# Patient Record
Sex: MALE | Race: ASIAN | NOT HISPANIC OR LATINO | Employment: FULL TIME | URBAN - METROPOLITAN AREA
[De-identification: names, ages, dates, MRNs, and addresses within clinical notes are randomized per-mention and may not be internally consistent; named-entity substitution may affect disease eponyms.]

---

## 2018-06-30 ENCOUNTER — HOSPITAL ENCOUNTER (EMERGENCY)
Facility: HOSPITAL | Age: 43
Discharge: HOME | End: 2018-06-30
Attending: EMERGENCY MEDICINE
Payer: COMMERCIAL

## 2018-06-30 VITALS
OXYGEN SATURATION: 95 % | DIASTOLIC BLOOD PRESSURE: 94 MMHG | RESPIRATION RATE: 18 BRPM | HEART RATE: 88 BPM | HEIGHT: 64 IN | TEMPERATURE: 99 F | WEIGHT: 170 LBS | SYSTOLIC BLOOD PRESSURE: 156 MMHG | BODY MASS INDEX: 29.02 KG/M2

## 2018-06-30 DIAGNOSIS — R52 PAIN: ICD-10-CM

## 2018-06-30 DIAGNOSIS — S22.32XA CLOSED FRACTURE OF ONE RIB OF LEFT SIDE, INITIAL ENCOUNTER: Primary | ICD-10-CM

## 2018-06-30 PROCEDURE — 96372 THER/PROPH/DIAG INJ SC/IM: CPT

## 2018-06-30 PROCEDURE — 25000003 PHARM REV CODE 250: Performed by: EMERGENCY MEDICINE

## 2018-06-30 PROCEDURE — 99284 EMERGENCY DEPT VISIT MOD MDM: CPT | Mod: 25

## 2018-06-30 PROCEDURE — 63600175 PHARM REV CODE 636 W HCPCS: Performed by: EMERGENCY MEDICINE

## 2018-06-30 RX ORDER — CYCLOBENZAPRINE HCL 10 MG
10 TABLET ORAL
Status: COMPLETED | OUTPATIENT
Start: 2018-06-30 | End: 2018-06-30

## 2018-06-30 RX ORDER — KETOROLAC TROMETHAMINE 30 MG/ML
60 INJECTION, SOLUTION INTRAMUSCULAR; INTRAVENOUS
Status: COMPLETED | OUTPATIENT
Start: 2018-06-30 | End: 2018-06-30

## 2018-06-30 RX ORDER — METHOCARBAMOL 500 MG/1
1000 TABLET, FILM COATED ORAL 3 TIMES DAILY
Qty: 30 TABLET | Refills: 0 | Status: SHIPPED | OUTPATIENT
Start: 2018-06-30 | End: 2018-07-05

## 2018-06-30 RX ORDER — OXYCODONE HYDROCHLORIDE 5 MG/1
5 TABLET ORAL EVERY 4 HOURS PRN
Qty: 12 TABLET | Refills: 0 | OUTPATIENT
Start: 2018-06-30 | End: 2018-09-13

## 2018-06-30 RX ORDER — NAPROXEN 500 MG/1
500 TABLET ORAL 2 TIMES DAILY
Qty: 30 TABLET | Refills: 0 | OUTPATIENT
Start: 2018-06-30 | End: 2018-09-13

## 2018-06-30 RX ADMIN — KETOROLAC TROMETHAMINE 60 MG: 30 INJECTION, SOLUTION INTRAMUSCULAR; INTRAVENOUS at 09:06

## 2018-06-30 RX ADMIN — CYCLOBENZAPRINE HYDROCHLORIDE 10 MG: 10 TABLET, FILM COATED ORAL at 09:06

## 2018-07-01 NOTE — ED PROVIDER NOTES
Encounter Date: 6/30/2018       History     Chief Complaint   Patient presents with    Pain to rib cage     Pt reports that he has left side rib cage pain after being hit while playing kick ball.  Pt reports pain with deep breaths cough, sneeze or extended arm.  Pt also report if he lay on either side pain gets worse. Aissatou taken this am with min relief.     44 yo male with left chest wall pain for 6 days after collision during kick ball game.      The history is provided by the patient.     Review of patient's allergies indicates:   Allergen Reactions    Tylenol [acetaminophen] Hives     History reviewed. No pertinent past medical history.  History reviewed. No pertinent surgical history.  No family history on file.  Social History   Substance Use Topics    Smoking status: Never Smoker    Smokeless tobacco: Not on file    Alcohol use No     Review of Systems    Physical Exam     Initial Vitals [06/30/18 2017]   BP Pulse Resp Temp SpO2   (!) 161/95 88 18 98.8 °F (37.1 °C) (!) 94 %      MAP       --         Physical Exam    Nursing note and vitals reviewed.  Constitutional: He appears well-developed and well-nourished.   HENT:   Head: Normocephalic and atraumatic.   Nose: Nose normal.   Eyes: Conjunctivae and EOM are normal. Pupils are equal, round, and reactive to light.   Neck: Normal range of motion. Neck supple.   Cardiovascular: Normal rate, regular rhythm, normal heart sounds and intact distal pulses.   Pulmonary/Chest: Breath sounds normal.   Pt with tenderness to left chest wall - point tenderness, worse with deep breathing / cough.    Musculoskeletal: Normal range of motion.   Neurological: He is alert and oriented to person, place, and time. He has normal strength and normal reflexes.   Skin: Skin is warm and dry.   Psychiatric: He has a normal mood and affect.         ED Course   Procedures  Labs Reviewed - No data to display       Imaging Results          X-Ray Ribs 2 View Left (Final result)   Result time 06/30/18 21:22:06    Final result by Red Vega MD (06/30/18 21:22:06)                 Impression:      1. Acute fracture of left lateral rib 5.      Electronically signed by: Red Vega MD  Date:    06/30/2018  Time:    21:22             Narrative:    EXAMINATION:  XR RIBS 2 VIEW LEFT    CLINICAL HISTORY:  Pain, unspecified    TECHNIQUE:  Two views of the left ribs were performed.    COMPARISON:  None.    FINDINGS:  Four views left ribs.    There is an acute appearing fracture of left lateral rib 5.  The visualized left lung zones are grossly clear..                                 Medical Decision Making:   Initial Assessment:   Left chest wall pain after trauma during softball game.   Differential Diagnosis:   Rib fx / rib strain / sprain, chest wall contusion  Clinical Tests:   Radiological Study: Ordered and Reviewed  ED Management:  42 yo male with left chest wall pain with rib fx. dispo with meds and instructions.                      Clinical Impression:   The primary encounter diagnosis was Closed fracture of one rib of left side, initial encounter. A diagnosis of Pain was also pertinent to this visit.                             Sj Redding MD  06/30/18 2802

## 2018-07-01 NOTE — ED TRIAGE NOTES
Patient arrived to ED with c/o left side rib pain secondary to being hit while playing kickball at work x 5 days ago.  Reports that pain is worse with deep breathing, lifting left arm, laying on left side, sneezing, or coughing.  No bruising, swelling, or deformity noted.  Patient is able to take deep breaths with equal bilateral breath sounds.

## 2018-09-12 ENCOUNTER — HOSPITAL ENCOUNTER (EMERGENCY)
Facility: HOSPITAL | Age: 43
Discharge: HOME | End: 2018-09-13
Attending: EMERGENCY MEDICINE
Payer: COMMERCIAL

## 2018-09-12 DIAGNOSIS — N20.0 NEPHROLITHIASIS: Primary | ICD-10-CM

## 2018-09-12 LAB
BILIRUB UR QL STRIP: NEGATIVE
CLARITY UR: CLEAR
COLOR UR: NORMAL
GLUCOSE UR QL STRIP: NEGATIVE
HGB UR QL STRIP: NEGATIVE
KETONES UR QL STRIP: NEGATIVE
LEUKOCYTE ESTERASE UR QL STRIP: NEGATIVE
NITRITE UR QL STRIP: NEGATIVE
PH UR STRIP: 7 [PH] (ref 5–8)
PROT UR QL STRIP: NEGATIVE
SP GR UR STRIP: 1.01 (ref 1–1.03)
URN SPEC COLLECT METH UR: NORMAL
UROBILINOGEN UR STRIP-ACNC: NEGATIVE EU/DL

## 2018-09-12 PROCEDURE — 99284 EMERGENCY DEPT VISIT MOD MDM: CPT

## 2018-09-12 PROCEDURE — 81003 URINALYSIS AUTO W/O SCOPE: CPT

## 2018-09-13 VITALS
HEART RATE: 60 BPM | OXYGEN SATURATION: 98 % | TEMPERATURE: 98 F | HEIGHT: 65 IN | BODY MASS INDEX: 27.49 KG/M2 | RESPIRATION RATE: 18 BRPM | SYSTOLIC BLOOD PRESSURE: 140 MMHG | WEIGHT: 165 LBS | DIASTOLIC BLOOD PRESSURE: 90 MMHG

## 2018-09-13 PROBLEM — N20.0 NEPHROLITHIASIS: Status: ACTIVE | Noted: 2018-09-13

## 2018-09-13 PROCEDURE — 25000003 PHARM REV CODE 250: Performed by: NURSE PRACTITIONER

## 2018-09-13 RX ORDER — OXYCODONE HYDROCHLORIDE 5 MG/1
5 TABLET ORAL
Status: COMPLETED | OUTPATIENT
Start: 2018-09-13 | End: 2018-09-13

## 2018-09-13 RX ORDER — OXYCODONE HYDROCHLORIDE 5 MG/1
5 CAPSULE ORAL EVERY 4 HOURS PRN
Qty: 30 CAPSULE | Refills: 0 | Status: SHIPPED | OUTPATIENT
Start: 2018-09-13 | End: 2018-10-18 | Stop reason: ALTCHOICE

## 2018-09-13 RX ORDER — TAMSULOSIN HYDROCHLORIDE 0.4 MG/1
0.4 CAPSULE ORAL DAILY
Qty: 14 CAPSULE | Refills: 0 | Status: SHIPPED | OUTPATIENT
Start: 2018-09-13 | End: 2018-10-18 | Stop reason: ALTCHOICE

## 2018-09-13 RX ADMIN — OXYCODONE HYDROCHLORIDE 5 MG: 5 TABLET ORAL at 12:09

## 2018-09-13 NOTE — ED PROVIDER NOTES
Encounter Date: 9/12/2018     This is a 43 y.o. male complaining of left flank pain he believes may be a kidney stone. Pain is intermittent and severe. Hx of previous kidney stones and states that this is similar. No varsha hematuria. Symptoms began about 4 hours ago. Took naproxen PTA.    I have evaluated and conducted a medical screening exam with initial orders entered, if indicated, to expedite care. The patient will be placed in a treatment area when one is available. Care will be transferred to an alternate provider for a full assessment including but not limited to: history, physical exam, additional orders, and final disposition.    Bobby Tellez NP         History     Chief Complaint   Patient presents with    Abdominal Pain     reports left sided abdominal pain and left flank pain; reports hx kidney stones and reports this feels like the kidney stones before; reports he had bad pain, it resided, and not it's back; symptoms started around 1900 today     42 y/o male which presents with left sided back pain that comes to his abdomen. The pain began around 7pm and is intermittent. Pt denies dysuria, blood in urine, chest pain, blurry vision, or headache. Pt does have history of kidney stones and he states the pain feels the same way.       The history is provided by the patient.     Review of patient's allergies indicates:   Allergen Reactions    Tylenol [acetaminophen] Hives     History reviewed. No pertinent past medical history.  History reviewed. No pertinent surgical history.  No family history on file.  Social History     Tobacco Use    Smoking status: Never Smoker    Smokeless tobacco: Never Used   Substance Use Topics    Alcohol use: No    Drug use: No     Review of Systems   Constitutional: Negative for chills and fever.   Respiratory: Negative for chest tightness and shortness of breath.    Cardiovascular: Negative for chest pain.   Gastrointestinal: Positive for abdominal pain. Negative for  nausea and vomiting.   Genitourinary: Positive for flank pain. Negative for dysuria, hematuria, penile pain and testicular pain.   Musculoskeletal: Positive for back pain.   All other systems reviewed and are negative.    Physical Exam     Initial Vitals [09/12/18 2305]   BP Pulse Resp Temp SpO2   (!) 182/113 85 16 98.7 °F (37.1 °C) 98 %      MAP       --         Physical Exam    Nursing note and vitals reviewed.  Constitutional: He appears well-developed and well-nourished.   HENT:   Head: Normocephalic and atraumatic.   Cardiovascular: Normal rate, regular rhythm, normal heart sounds and intact distal pulses. Exam reveals no gallop and no friction rub.    No murmur heard.  Pulmonary/Chest: Breath sounds normal. No respiratory distress. He has no wheezes. He has no rhonchi. He has no rales. He exhibits no tenderness.   Abdominal: Soft. Normal appearance and bowel sounds are normal. He exhibits no distension and no mass. There is tenderness in the left lower quadrant. There is CVA tenderness (left sided). There is no rebound and no guarding.   Musculoskeletal: Normal range of motion. He exhibits no edema or tenderness.   Neurological: He is alert and oriented to person, place, and time. He has normal strength. GCS score is 15. GCS eye subscore is 4. GCS verbal subscore is 5. GCS motor subscore is 6.       ED Course   Procedures  Labs Reviewed   URINALYSIS, REFLEX TO URINE CULTURE    Narrative:     Preferred Collection Type->Urine, Clean Catch          Imaging Results          CT Abdomen Pelvis  Without Contrast (Final result)  Result time 09/13/18 00:22:59    Final result by Jennifer Blood MD (09/13/18 00:22:59)                 Impression:      1. Two adjacent 5 mm stones within the left mid ureter resulting in mild proximal hydronephrosis.  2. Punctate nonobstructing right renal stone.      Electronically signed by: eJnnifer Blood MD  Date:    09/13/2018  Time:    00:22             Narrative:    EXAMINATION:  CT  ABDOMEN PELVIS WITHOUT CONTRAST    CLINICAL HISTORY:  Flank pain, stone disease suspected;    TECHNIQUE:  Low dose axial images, sagittal and coronal reformations were obtained from the lung bases to the pubic symphysis.  Oral contrast was not administered.    COMPARISON:  None    FINDINGS:  The visualized portion of the heart is unremarkable.  The lung bases are clear.    No significant hepatic abnormality seen on this noncontrast exam.  There is no intra-or extrahepatic biliary ductal dilatation.  The gallbladder is unremarkable.  The stomach, pancreas, spleen, and adrenal glands are unremarkable.    Punctate nonobstructing stone is seen within the right kidney.  There are two adjacent 5 mm stone is visualized within the left mid ureter which result in mild proximal hydronephrosis on the left.  No additional renal stones are seen.  No stones are seen along the right ureteral course. Urinary bladder and prostate show no significant abnormalities.    Appendix is visualized and is unremarkable.  The visualized loops of small and large bowel show no evidence of obstruction or inflammation.  No free air or free fluid.    Aorta tapers normally.    No acute osseous abnormality identified.  Remote left anterolateral 6th rib fracture is seen.  Subcutaneous soft tissue structures are unremarkable.                                 Medical Decision Making:   Initial Assessment:   44 y/o male which presents with left sided flank and LLQ pain. Pt has a history of kidney stones and he feels like its a kidney stone. No hematuria per patient.   Differential Diagnosis:   Nephrolithiasis, back pain, constipation, UTI  Clinical Tests:   Lab Tests: Reviewed and Ordered  The following lab test(s) were unremarkable: Urinalysis  Radiological Study: Ordered and Reviewed  ED Management:  Pt examined and given oxycodone ER as he is allergic to tylenol. Ct abdomen/pelvis indicated 2 5 mm nephrolithiasis. Pt given strict return precautions and  advised to drink at least 2L of fluid a day. He was given flomax and pain medication along with a strainer to strain his urine. Ambulatory referral to urology placed. Pt voiced understanding of all discharge instructions.                       Clinical Impression:   The encounter diagnosis was Nephrolithiasis.                             Mayra Blackmon, ESSIE  09/13/18 0035

## 2018-09-13 NOTE — ED TRIAGE NOTES
Pt presents to ED with reports of left sided abdominal pain and left flank pain; reports hx kidney stones and reports this feels like the kidney stones before; reports he had bad pain, it resided, and not it's back; symptoms started around 1900 today

## 2018-09-14 ENCOUNTER — HOSPITAL ENCOUNTER (EMERGENCY)
Facility: HOSPITAL | Age: 43
Discharge: HOME | End: 2018-09-14
Attending: EMERGENCY MEDICINE
Payer: COMMERCIAL

## 2018-09-14 VITALS
SYSTOLIC BLOOD PRESSURE: 164 MMHG | RESPIRATION RATE: 16 BRPM | HEIGHT: 64 IN | WEIGHT: 165 LBS | DIASTOLIC BLOOD PRESSURE: 100 MMHG | HEART RATE: 91 BPM | BODY MASS INDEX: 28.17 KG/M2 | OXYGEN SATURATION: 98 % | TEMPERATURE: 99 F

## 2018-09-14 DIAGNOSIS — N20.0 NEPHROLITHIASIS: Primary | ICD-10-CM

## 2018-09-14 PROCEDURE — 96372 THER/PROPH/DIAG INJ SC/IM: CPT

## 2018-09-14 PROCEDURE — 99283 EMERGENCY DEPT VISIT LOW MDM: CPT | Mod: 25

## 2018-09-14 PROCEDURE — 63600175 PHARM REV CODE 636 W HCPCS: Performed by: PHYSICIAN ASSISTANT

## 2018-09-14 RX ORDER — KETOROLAC TROMETHAMINE 30 MG/ML
15 INJECTION, SOLUTION INTRAMUSCULAR; INTRAVENOUS
Status: COMPLETED | OUTPATIENT
Start: 2018-09-14 | End: 2018-09-14

## 2018-09-14 RX ADMIN — KETOROLAC TROMETHAMINE 15 MG: 30 INJECTION, SOLUTION INTRAMUSCULAR; INTRAVENOUS at 06:09

## 2018-09-14 NOTE — DISCHARGE INSTRUCTIONS
Take Flomax, Percocet and Zofran as previously prescribed.     Follow up with Urology. Return to ER for worsening symptoms, difficulty urinating, fever or as needed.

## 2018-09-14 NOTE — PROVIDER PROGRESS NOTES - EMERGENCY DEPT.
Encounter Date: 9/12/2018    ED Physician Progress Notes         patient return to ED today stating that his oxycodone was causing significant nausea vomiting. Discussed the patient about other options, patient stated that he was able to take NyQuil:  Cold which including Tylenol in the past without any allergic reaction.  Patient stated he took Tylenol and ibuprofen few years ago and had hives but otherwise no other allergic reaction report since.  Prescription for Percocet 7.5 mg and Zofran 4 mg ODT given at ED today.  Patient was instructed to start taking medication if he have any allergic reactions.

## 2018-09-14 NOTE — ED PROVIDER NOTES
Encounter Date: 9/14/2018       History     Chief Complaint   Patient presents with    Nephrolithiasis     Was dx with stones and is still having a lot of pain on left side     CC: L flank pain    HPI:  Patient is a 43-year-old male with  History of nephrolithiasis, previously seen at this facility on 09/12/2018 for left flank pain, hematuria, NV and diagnosed with nephrolithiasis  Presents for evaluation of persisting pain to left flank.  Patient receives taking Flomax as prescribed. He was also taking oxycodone, however, reports associated nausea and vomiting with the medication. Patient has prescription for Percocet and Zofran as prescribed earlier today at this facility. Denies difficulty urinating, fever, diarrhea, constipation, hematuria.                 Review of patient's allergies indicates:   Allergen Reactions    Tylenol [acetaminophen] Hives     History reviewed. No pertinent past medical history.  History reviewed. No pertinent surgical history.  History reviewed. No pertinent family history.  Social History     Tobacco Use    Smoking status: Never Smoker    Smokeless tobacco: Never Used   Substance Use Topics    Alcohol use: No    Drug use: No     Review of Systems   Constitutional: Negative for chills and fever.   Gastrointestinal: Positive for nausea and vomiting. Negative for abdominal pain.   Genitourinary: Positive for flank pain. Negative for difficulty urinating, dysuria, frequency, hematuria and urgency.   Neurological: Negative for dizziness and light-headedness.       Physical Exam     Initial Vitals [09/14/18 1836]   BP Pulse Resp Temp SpO2   (!) 164/100 91 16 99.3 °F (37.4 °C) 98 %      MAP       --         Physical Exam    Nursing note and vitals reviewed.  Constitutional: He appears well-developed and well-nourished. No distress.   HENT:   Head: Normocephalic.   Right Ear: Hearing, tympanic membrane, external ear and ear canal normal.   Left Ear: Hearing, tympanic membrane, external  ear and ear canal normal.   Nose: Nose normal.   Mouth/Throat: Uvula is midline, oropharynx is clear and moist and mucous membranes are normal.   Eyes: Conjunctivae are normal.   Neck: Neck supple.   Cardiovascular: Normal rate and regular rhythm. Exam reveals no gallop and no friction rub.    No murmur heard.  Pulmonary/Chest: Breath sounds normal. No respiratory distress. He has no wheezes. He has no rhonchi. He has no rales.   Abdominal: Soft. Bowel sounds are normal. He exhibits no distension. There is no tenderness. There is no rebound and no guarding.   TTP to L flank   Musculoskeletal: Normal range of motion.   Lymphadenopathy:     He has no cervical adenopathy.   Neurological: He is alert.   Skin: Skin is warm and dry. No rash noted.   Psychiatric: He has a normal mood and affect.         ED Course   Procedures  Labs Reviewed - No data to display       Imaging Results    None          Medical Decision Making:   Initial Assessment:     43-year-old male with history of nephrolithiasis presenting for evaluation of persistent left flank pain.  Patient diagnosed with nephrolithiasis at this facility 2 days ago.  Reports compliance with Flomax and oxycodone but reports nausea and vomiting that is associated with taking oxycodone.  Patient was evaluated at this facility earlier today prescribed Percocet and Zofran for symptomatic treatment.   Patient is afebrile, well-appearing in no distress.  Exam fi ndings detailed above.   Toradol given for pain.  Patient denies difficulty urinating.  He denies fever.  Afebrile at this facility.  Will have patient follow up with urologist at his appointment in 6 days.  And return to the ER for worsening symptoms, difficulty urinating, fever or as needed.  Discussed this patient with Dr. Barrios who agrees with assessment and plan.               Attending Attestation:     Physician Attestation Statement for NP/PA:   I have conducted a face to face encounter with this patient in  addition to the NP/PA, due to Patient Request    Other NP/PA Attestation Additions:    History of Present Illness: 43-year-old male with recent history of a left flank pain due to ureterolithiasis.  Patient presented with pain after unable to take his pain medication due to nausea vomiting.     Physical Exam: Well-appearing, no acute nausea vomiting, tender to palpation to left flank.   Medical Decision Making: Pain control with IM Toradol.  Patient is stable for discharge                    Clinical Impression:   The encounter diagnosis was Nephrolithiasis.                             Lisa Arreguin PA-C  09/14/18 2038       Mandeep Barrios MD  09/14/18 [id number]

## 2018-09-14 NOTE — ED TRIAGE NOTES
Patient stated that he's having kidney pain. Was here Wed night and was diagnosed with stones. Patient is straining urine at home. Patient has an appt for 9-20-18 for Urology. Pain 8/10. Sharp in nature. Patient took Oxycodone with some relief however he vomits when he takes them.

## 2018-09-20 ENCOUNTER — OFFICE VISIT (OUTPATIENT)
Dept: UROLOGY | Facility: CLINIC | Age: 43
End: 2018-09-20
Payer: COMMERCIAL

## 2018-09-20 VITALS
HEIGHT: 64 IN | RESPIRATION RATE: 14 BRPM | SYSTOLIC BLOOD PRESSURE: 130 MMHG | DIASTOLIC BLOOD PRESSURE: 82 MMHG | WEIGHT: 164.44 LBS | BODY MASS INDEX: 28.07 KG/M2

## 2018-09-20 DIAGNOSIS — N20.0 NEPHROLITHIASIS: Primary | ICD-10-CM

## 2018-09-20 DIAGNOSIS — R10.9 LEFT FLANK PAIN: ICD-10-CM

## 2018-09-20 PROCEDURE — 99244 OFF/OP CNSLTJ NEW/EST MOD 40: CPT | Mod: S$GLB,,, | Performed by: UROLOGY

## 2018-09-20 PROCEDURE — 99999 PR PBB SHADOW E&M-EST. PATIENT-LVL IV: CPT | Mod: PBBFAC,,, | Performed by: UROLOGY

## 2018-09-20 RX ORDER — OXYCODONE AND ACETAMINOPHEN 7.5; 325 MG/1; MG/1
TABLET ORAL
Refills: 0 | COMMUNITY
Start: 2018-09-14 | End: 2018-10-18 | Stop reason: ALTCHOICE

## 2018-09-20 NOTE — PROGRESS NOTES
"Subjective:       Tito Barrios is a 43 y.o. male who is a new patient who was referred by Dr Barrios for evaluation of nephrolithiasis.      He was seen in ER 9/13/18 and 9/14/18 with L flank pain. CT showed two 5mm stones in L mid ureter with mild proximal hydronephrosis. Punctate R renal stone.     No prior h/o stones. Pain has been well controlled since last ER visit. He has been straining his urine without any stones noted.      The following portions of the patient's history were reviewed and updated as appropriate: allergies, current medications, past family history, past medical history, past social history, past surgical history and problem list.    Review of Systems  Constitutional: no fever or chills  ENT: no nasal congestion or sore throat  Respiratory: no cough or shortness of breath  Cardiovascular: no chest pain or palpitations  Gastrointestinal: no nausea or vomiting, tolerating diet  Genitourinary: as per HPI  Hematologic/Lymphatic: no easy bruising or lymphadenopathy  Musculoskeletal: no arthralgias or myalgias  Skin: no rashes or lesions  Neurological: no seizures or tremors  Behavioral/Psych: no auditory or visual hallucinations       Objective:    Vitals: /82   Resp 14   Ht 5' 4" (1.626 m)   Wt 74.6 kg (164 lb 7.4 oz)   BMI 28.23 kg/m²     Physical Exam   General: well developed, well nourished in no acute distress  Head: normocephalic, atraumatic  Neck: supple, trachea midline, no obvious enlargement of thyroid  HEENT: EOMI, mucus membranes moist, sclera anicteric, no hearing impairment  Lungs: symmetric expansion, non-labored breathing  Cardiovascular: regular rate and rhythm, normal pulses  Abdomen: soft, non tender, non distended, no palpable masses, no hepatosplenomegaly, no hernias, bladder nonpalpable. No CVA tenderness.  Musculoskeletal: no peripheral edema, normal ROM in bilateral upper and lower extremities  Lymphatics: no cervical or inguinal lymphadenopathy  Skin: no " rashes or lesions  Neuro: alert and oriented x 3, no gross deficits  Psych: normal judgment and insight, normal mood/affect and non-anxious  Genitourinary:   patient declined exam   AMANDA: patient declined exam      Lab Review   Urine analysis today in clinic shows Negative     No results found for: WBC, HGB, HCT, MCV, PLT  No results found for: CREATININE, BUN  No results found for: PSA  No results found for: PSADIAG    Imaging  CT reviewed  Reports and images were personally reviewed by me and discussed with the patient today         Assessment/Plan:      1. Nephrolithiasis    -Two 5mm stones in mid L ureter   - Doubt spontaneous passage   - Flomax   - Offered URS/LL for stone treatment. Will proceed tomorrow   - OR 9/21/18 for L URS/LL/stent. Discussed possible 2nd procedure.     2. Left flank pain    - 2/2 L ureteral stones       Follow up in 3 weeks

## 2018-09-21 ENCOUNTER — HOSPITAL ENCOUNTER (OUTPATIENT)
Dept: PREADMISSION TESTING | Facility: HOSPITAL | Age: 43
Discharge: HOME | End: 2018-09-21
Attending: UROLOGY | Admitting: UROLOGY
Payer: COMMERCIAL

## 2018-09-21 ENCOUNTER — ANESTHESIA (OUTPATIENT)
Dept: SURGERY | Facility: HOSPITAL | Age: 43
End: 2018-09-21
Payer: COMMERCIAL

## 2018-09-21 ENCOUNTER — HOSPITAL ENCOUNTER (OUTPATIENT)
Facility: HOSPITAL | Age: 43
Discharge: HOME | End: 2018-09-21
Attending: UROLOGY | Admitting: UROLOGY
Payer: COMMERCIAL

## 2018-09-21 ENCOUNTER — ANESTHESIA EVENT (OUTPATIENT)
Dept: SURGERY | Facility: HOSPITAL | Age: 43
End: 2018-09-21
Payer: COMMERCIAL

## 2018-09-21 VITALS
TEMPERATURE: 98 F | WEIGHT: 173.06 LBS | BODY MASS INDEX: 29.55 KG/M2 | SYSTOLIC BLOOD PRESSURE: 136 MMHG | HEART RATE: 64 BPM | RESPIRATION RATE: 16 BRPM | HEIGHT: 64 IN | DIASTOLIC BLOOD PRESSURE: 99 MMHG | OXYGEN SATURATION: 98 %

## 2018-09-21 VITALS
BODY MASS INDEX: 29.71 KG/M2 | OXYGEN SATURATION: 98 % | WEIGHT: 173.06 LBS | SYSTOLIC BLOOD PRESSURE: 149 MMHG | DIASTOLIC BLOOD PRESSURE: 91 MMHG | TEMPERATURE: 98 F | RESPIRATION RATE: 17 BRPM | HEART RATE: 66 BPM

## 2018-09-21 DIAGNOSIS — Z01.818 PRE-OP TESTING: Primary | ICD-10-CM

## 2018-09-21 DIAGNOSIS — N20.0 NEPHROLITHIASIS: Primary | ICD-10-CM

## 2018-09-21 LAB
BASOPHILS # BLD AUTO: 0.02 K/UL
BASOPHILS NFR BLD: 0.3 %
DIFFERENTIAL METHOD: NORMAL
EOSINOPHIL # BLD AUTO: 0.3 K/UL
EOSINOPHIL NFR BLD: 4.3 %
ERYTHROCYTE [DISTWIDTH] IN BLOOD BY AUTOMATED COUNT: 12.3 %
HCT VFR BLD AUTO: 44.7 %
HGB BLD-MCNC: 15.1 G/DL
LYMPHOCYTES # BLD AUTO: 3.3 K/UL
LYMPHOCYTES NFR BLD: 41.8 %
MCH RBC QN AUTO: 29.5 PG
MCHC RBC AUTO-ENTMCNC: 33.8 G/DL
MCV RBC AUTO: 87 FL
MONOCYTES # BLD AUTO: 0.6 K/UL
MONOCYTES NFR BLD: 7.9 %
NEUTROPHILS # BLD AUTO: 3.6 K/UL
NEUTROPHILS NFR BLD: 45.4 %
PLATELET # BLD AUTO: 256 K/UL
PMV BLD AUTO: 10 FL
RBC # BLD AUTO: 5.12 M/UL
WBC # BLD AUTO: 7.88 K/UL

## 2018-09-21 PROCEDURE — 36000707: Performed by: UROLOGY

## 2018-09-21 PROCEDURE — C1773 RET DEV, INSERTABLE: HCPCS | Performed by: UROLOGY

## 2018-09-21 PROCEDURE — 25000003 PHARM REV CODE 250: Performed by: UROLOGY

## 2018-09-21 PROCEDURE — 25000003 PHARM REV CODE 250: Performed by: ANESTHESIOLOGY

## 2018-09-21 PROCEDURE — 76000 FLUOROSCOPY <1 HR PHYS/QHP: CPT | Mod: 26,59,, | Performed by: UROLOGY

## 2018-09-21 PROCEDURE — 37000008 HC ANESTHESIA 1ST 15 MINUTES: Performed by: UROLOGY

## 2018-09-21 PROCEDURE — 25000003 PHARM REV CODE 250: Performed by: NURSE ANESTHETIST, CERTIFIED REGISTERED

## 2018-09-21 PROCEDURE — 63600175 PHARM REV CODE 636 W HCPCS: Performed by: NURSE ANESTHETIST, CERTIFIED REGISTERED

## 2018-09-21 PROCEDURE — 88300 SURGICAL PATH GROSS: CPT | Performed by: PATHOLOGY

## 2018-09-21 PROCEDURE — 71000033 HC RECOVERY, INTIAL HOUR: Performed by: UROLOGY

## 2018-09-21 PROCEDURE — D9220A PRA ANESTHESIA: Mod: ,,, | Performed by: ANESTHESIOLOGY

## 2018-09-21 PROCEDURE — 36415 COLL VENOUS BLD VENIPUNCTURE: CPT

## 2018-09-21 PROCEDURE — 74420 UROGRAPHY RTRGR +-KUB: CPT | Mod: 26,,, | Performed by: UROLOGY

## 2018-09-21 PROCEDURE — C1769 GUIDE WIRE: HCPCS | Performed by: UROLOGY

## 2018-09-21 PROCEDURE — 85025 COMPLETE CBC W/AUTO DIFF WBC: CPT

## 2018-09-21 PROCEDURE — 63600175 PHARM REV CODE 636 W HCPCS: Performed by: UROLOGY

## 2018-09-21 PROCEDURE — 63600175 PHARM REV CODE 636 W HCPCS: Performed by: ANESTHESIOLOGY

## 2018-09-21 PROCEDURE — 71000016 HC POSTOP RECOV ADDL HR: Performed by: UROLOGY

## 2018-09-21 PROCEDURE — C2617 STENT, NON-COR, TEM W/O DEL: HCPCS | Performed by: UROLOGY

## 2018-09-21 PROCEDURE — 37000009 HC ANESTHESIA EA ADD 15 MINS: Performed by: UROLOGY

## 2018-09-21 PROCEDURE — 36000706: Performed by: UROLOGY

## 2018-09-21 PROCEDURE — 25500020 PHARM REV CODE 255: Performed by: UROLOGY

## 2018-09-21 PROCEDURE — 27201423 OPTIME MED/SURG SUP & DEVICES STERILE SUPPLY: Performed by: UROLOGY

## 2018-09-21 PROCEDURE — 52356 CYSTO/URETERO W/LITHOTRIPSY: CPT | Mod: LT,,, | Performed by: UROLOGY

## 2018-09-21 PROCEDURE — 71000015 HC POSTOP RECOV 1ST HR: Performed by: UROLOGY

## 2018-09-21 PROCEDURE — C1758 CATHETER, URETERAL: HCPCS | Performed by: UROLOGY

## 2018-09-21 PROCEDURE — 88300 SURGICAL PATH GROSS: CPT | Mod: 26,,, | Performed by: PATHOLOGY

## 2018-09-21 PROCEDURE — 82365 CALCULUS SPECTROSCOPY: CPT

## 2018-09-21 DEVICE — STENT URET PERCUFLEX 6FR 24CM: Type: IMPLANTABLE DEVICE | Site: URETER | Status: FUNCTIONAL

## 2018-09-21 RX ORDER — METOCLOPRAMIDE HYDROCHLORIDE 5 MG/ML
10 INJECTION INTRAMUSCULAR; INTRAVENOUS EVERY 10 MIN PRN
Status: DISCONTINUED | OUTPATIENT
Start: 2018-09-21 | End: 2018-09-21 | Stop reason: HOSPADM

## 2018-09-21 RX ORDER — MEPERIDINE HYDROCHLORIDE 50 MG/ML
12.5 INJECTION INTRAMUSCULAR; INTRAVENOUS; SUBCUTANEOUS ONCE AS NEEDED
Status: DISCONTINUED | OUTPATIENT
Start: 2018-09-21 | End: 2018-09-21 | Stop reason: HOSPADM

## 2018-09-21 RX ORDER — MIDAZOLAM HYDROCHLORIDE 1 MG/ML
INJECTION, SOLUTION INTRAMUSCULAR; INTRAVENOUS
Status: DISCONTINUED | OUTPATIENT
Start: 2018-09-21 | End: 2018-09-21

## 2018-09-21 RX ORDER — ACETAMINOPHEN 10 MG/ML
1000 INJECTION, SOLUTION INTRAVENOUS ONCE
Status: COMPLETED | OUTPATIENT
Start: 2018-09-21 | End: 2018-09-21

## 2018-09-21 RX ORDER — PROPOFOL 10 MG/ML
VIAL (ML) INTRAVENOUS
Status: DISCONTINUED | OUTPATIENT
Start: 2018-09-21 | End: 2018-09-21

## 2018-09-21 RX ORDER — GLYCOPYRROLATE 0.2 MG/ML
INJECTION INTRAMUSCULAR; INTRAVENOUS
Status: DISCONTINUED | OUTPATIENT
Start: 2018-09-21 | End: 2018-09-21

## 2018-09-21 RX ORDER — ONDANSETRON 2 MG/ML
4 INJECTION INTRAMUSCULAR; INTRAVENOUS EVERY 12 HOURS PRN
Status: DISCONTINUED | OUTPATIENT
Start: 2018-09-21 | End: 2018-09-21 | Stop reason: HOSPADM

## 2018-09-21 RX ORDER — SODIUM CHLORIDE, SODIUM LACTATE, POTASSIUM CHLORIDE, CALCIUM CHLORIDE 600; 310; 30; 20 MG/100ML; MG/100ML; MG/100ML; MG/100ML
INJECTION, SOLUTION INTRAVENOUS CONTINUOUS
Status: DISCONTINUED | OUTPATIENT
Start: 2018-09-21 | End: 2018-09-21 | Stop reason: HOSPADM

## 2018-09-21 RX ORDER — MORPHINE SULFATE 10 MG/ML
2 INJECTION INTRAMUSCULAR; INTRAVENOUS; SUBCUTANEOUS EVERY 5 MIN PRN
Status: DISCONTINUED | OUTPATIENT
Start: 2018-09-21 | End: 2018-09-21 | Stop reason: HOSPADM

## 2018-09-21 RX ORDER — ACETAMINOPHEN 325 MG/1
650 TABLET ORAL EVERY 4 HOURS PRN
Status: DISCONTINUED | OUTPATIENT
Start: 2018-09-21 | End: 2018-09-21 | Stop reason: HOSPADM

## 2018-09-21 RX ORDER — HYDROMORPHONE HYDROCHLORIDE 2 MG/ML
0.2 INJECTION, SOLUTION INTRAMUSCULAR; INTRAVENOUS; SUBCUTANEOUS EVERY 5 MIN PRN
Status: DISCONTINUED | OUTPATIENT
Start: 2018-09-21 | End: 2018-09-21 | Stop reason: HOSPADM

## 2018-09-21 RX ORDER — FENTANYL CITRATE 50 UG/ML
INJECTION, SOLUTION INTRAMUSCULAR; INTRAVENOUS
Status: DISCONTINUED | OUTPATIENT
Start: 2018-09-21 | End: 2018-09-21

## 2018-09-21 RX ORDER — OXYCODONE AND ACETAMINOPHEN 5; 325 MG/1; MG/1
1 TABLET ORAL
Status: DISCONTINUED | OUTPATIENT
Start: 2018-09-21 | End: 2018-09-21 | Stop reason: HOSPADM

## 2018-09-21 RX ORDER — SUCCINYLCHOLINE CHLORIDE 20 MG/ML
INJECTION INTRAMUSCULAR; INTRAVENOUS
Status: DISCONTINUED | OUTPATIENT
Start: 2018-09-21 | End: 2018-09-21

## 2018-09-21 RX ORDER — SODIUM CHLORIDE 0.9 % (FLUSH) 0.9 %
3 SYRINGE (ML) INJECTION
Status: DISCONTINUED | OUTPATIENT
Start: 2018-09-21 | End: 2018-09-21 | Stop reason: HOSPADM

## 2018-09-21 RX ORDER — PHENAZOPYRIDINE HYDROCHLORIDE 100 MG/1
200 TABLET, FILM COATED ORAL 3 TIMES DAILY PRN
Status: DISCONTINUED | OUTPATIENT
Start: 2018-09-21 | End: 2018-09-21 | Stop reason: HOSPADM

## 2018-09-21 RX ORDER — PHENAZOPYRIDINE HYDROCHLORIDE 100 MG/1
200 TABLET, FILM COATED ORAL 3 TIMES DAILY PRN
Qty: 18 TABLET | Refills: 0 | Status: SHIPPED | OUTPATIENT
Start: 2018-09-21 | End: 2018-09-24 | Stop reason: SDUPTHER

## 2018-09-21 RX ORDER — CEFAZOLIN SODIUM 2 G/50ML
2 SOLUTION INTRAVENOUS
Status: COMPLETED | OUTPATIENT
Start: 2018-09-21 | End: 2018-09-21

## 2018-09-21 RX ORDER — NEOSTIGMINE METHYLSULFATE 1 MG/ML
INJECTION, SOLUTION INTRAVENOUS
Status: DISCONTINUED | OUTPATIENT
Start: 2018-09-21 | End: 2018-09-21

## 2018-09-21 RX ORDER — ROCURONIUM BROMIDE 10 MG/ML
INJECTION, SOLUTION INTRAVENOUS
Status: DISCONTINUED | OUTPATIENT
Start: 2018-09-21 | End: 2018-09-21

## 2018-09-21 RX ORDER — OXYCODONE AND ACETAMINOPHEN 5; 325 MG/1; MG/1
1 TABLET ORAL EVERY 4 HOURS PRN
Qty: 10 TABLET | Refills: 0 | Status: SHIPPED | OUTPATIENT
Start: 2018-09-21 | End: 2018-10-18 | Stop reason: ALTCHOICE

## 2018-09-21 RX ORDER — LIDOCAINE HCL/PF 100 MG/5ML
SYRINGE (ML) INTRAVENOUS
Status: DISCONTINUED | OUTPATIENT
Start: 2018-09-21 | End: 2018-09-21

## 2018-09-21 RX ORDER — HYDROCODONE BITARTRATE AND ACETAMINOPHEN 5; 325 MG/1; MG/1
1 TABLET ORAL EVERY 4 HOURS PRN
Status: DISCONTINUED | OUTPATIENT
Start: 2018-09-21 | End: 2018-09-21 | Stop reason: HOSPADM

## 2018-09-21 RX ADMIN — GLYCOPYRROLATE 0.4 MG: 0.2 INJECTION, SOLUTION INTRAMUSCULAR; INTRAVENOUS at 12:09

## 2018-09-21 RX ADMIN — NEOSTIGMINE METHYLSULFATE 3.5 MG: 1 INJECTION INTRAVENOUS at 12:09

## 2018-09-21 RX ADMIN — SODIUM CHLORIDE, SODIUM LACTATE, POTASSIUM CHLORIDE, AND CALCIUM CHLORIDE: .6; .31; .03; .02 INJECTION, SOLUTION INTRAVENOUS at 12:09

## 2018-09-21 RX ADMIN — OXYCODONE HYDROCHLORIDE AND ACETAMINOPHEN 1 TABLET: 5; 325 TABLET ORAL at 12:09

## 2018-09-21 RX ADMIN — MIDAZOLAM HYDROCHLORIDE 2 MG: 1 INJECTION, SOLUTION INTRAMUSCULAR; INTRAVENOUS at 10:09

## 2018-09-21 RX ADMIN — LIDOCAINE HYDROCHLORIDE 100 MG: 20 INJECTION, SOLUTION INTRAVENOUS at 11:09

## 2018-09-21 RX ADMIN — PROPOFOL 160 MG: 10 INJECTION, EMULSION INTRAVENOUS at 11:09

## 2018-09-21 RX ADMIN — ACETAMINOPHEN 1000 MG: 10 INJECTION, SOLUTION INTRAVENOUS at 12:09

## 2018-09-21 RX ADMIN — GLYCOPYRROLATE 0.2 MG: 0.2 INJECTION, SOLUTION INTRAMUSCULAR; INTRAVENOUS at 11:09

## 2018-09-21 RX ADMIN — CEFAZOLIN SODIUM 2 G: 2 SOLUTION INTRAVENOUS at 11:09

## 2018-09-21 RX ADMIN — ROCURONIUM BROMIDE 5 MG: 10 INJECTION, SOLUTION INTRAVENOUS at 11:09

## 2018-09-21 RX ADMIN — SUCCINYLCHOLINE CHLORIDE 120 MG: 20 INJECTION, SOLUTION INTRAMUSCULAR; INTRAVENOUS at 11:09

## 2018-09-21 RX ADMIN — ONDANSETRON 4 MG: 2 INJECTION, SOLUTION INTRAMUSCULAR; INTRAVENOUS at 11:09

## 2018-09-21 RX ADMIN — PHENAZOPYRIDINE HYDROCHLORIDE 200 MG: 100 TABLET ORAL at 12:09

## 2018-09-21 RX ADMIN — SODIUM CHLORIDE, SODIUM LACTATE, POTASSIUM CHLORIDE, AND CALCIUM CHLORIDE: .6; .31; .03; .02 INJECTION, SOLUTION INTRAVENOUS at 09:09

## 2018-09-21 RX ADMIN — ROCURONIUM BROMIDE 20 MG: 10 INJECTION, SOLUTION INTRAVENOUS at 11:09

## 2018-09-21 RX ADMIN — FENTANYL CITRATE 100 MCG: 50 INJECTION INTRAMUSCULAR; INTRAVENOUS at 11:09

## 2018-09-21 NOTE — ANESTHESIA PREPROCEDURE EVALUATION
09/21/2018  Tito Barrios is a 43 y.o., male.    Anesthesia Evaluation    I have reviewed the Patient Summary Reports.     I have reviewed the Medications.     Review of Systems  Anesthesia Hx:  No previous Anesthesia    Social:  Non-Smoker, No Alcohol Use    Cardiovascular:   Exercise tolerance: good    Renal/:   renal calculi        Physical Exam  General:  Well nourished    Airway/Jaw/Neck:  AIRWAY FINDINGS: Normal       Dental:  DENTAL FINDINGS: Normal   Chest/Lungs:  Chest/Lungs Clear    Heart/Vascular:  Heart Findings: Normal       Mental Status:  Mental Status Findings:  Cooperative, Alert and Oriented         Anesthesia Plan  Type of Anesthesia, risks & benefits discussed:  Anesthesia Type:  general  Patient's Preference:   Intra-op Monitoring Plan: standard ASA monitors  Intra-op Monitoring Plan Comments:   Post Op Pain Control Plan: multimodal analgesia, IV/PO Opioids PRN and per primary service following discharge from PACU  Post Op Pain Control Plan Comments:   Induction:   IV  Beta Blocker:  Patient is not currently on a Beta-Blocker (No further documentation required).       Informed Consent: Patient understands risks and agrees with Anesthesia plan.  Questions answered. Anesthesia consent signed with patient.  ASA Score: 1     Day of Surgery Review of History & Physical:    H&P update referred to the surgeon.         Ready For Surgery From Anesthesia Perspective.

## 2018-09-21 NOTE — TRANSFER OF CARE
Anesthesia Transfer of Care Note    Patient: Tito Barrios    Procedure(s) Performed: Procedure(s) (LRB):  Cystoscopy, possible retrograde pyelogram, ureteroscopy with laser lithotripsy, stone basketing, placement of ureteral stent (Left)    Patient location: PACU    Anesthesia Type: general    Transport from OR: Transported from OR on room air with adequate spontaneous ventilation    Post pain: adequate analgesia    Post assessment: no apparent anesthetic complications    Post vital signs: stable    Level of consciousness: awake and responds to stimulation    Nausea/Vomiting: no nausea/vomiting    Complications: none    Transfer of care protocol was followed      Last vitals:   Visit Vitals  BP (!) 145/100 (BP Location: Right arm, Patient Position: Lying)   Pulse 85   Temp 36.6 °C (97.9 °F) (Oral)   Resp 12   SpO2 100%

## 2018-09-21 NOTE — OP NOTE
DATE OF PROCEDURE:  09/21/2018.    SURGEON:  Mckenzie Lu M.D.    PREOPERATIVE DIAGNOSIS:  Nephrolithiasis.    POSTOPERATIVE DIAGNOSIS:  Nephrolithiasis.    PROCEDURES PERFORMED:  Cystoscopy with left retrograde pyelogram, left   ureteroscopy, laser lithotripsy, stone extraction and placement of left ureteral   stent.    INDICATIONS FOR PROCEDURE:  Mr. Barrios is a 43-year-old gentleman who presented   to Emergency Department with severe left flank pain.  CT scan revealed two   stones in the mid left ureter approximately 5 mm in size each.  He underwent   trial of passage and was not able to pass the stone.  Pain has been well   controlled otherwise.  He presents today for definitive treatment of the stones.    DESCRIPTION OF PROCEDURE:  Mr. Barrios was brought to the Operating Room and   placed under general endotracheal anesthesia.  Full timeout procedures were   performed identifying the correct patient, procedure and laterality.    Appropriate IV antibiotics with Ancef was given prior to the commencement of   surgery.  The patient was then placed in dorsal lithotomy position and prepped   and draped in the usual sterile fashion.    A 22-Pakistani rigid cystoscope was passed per urethra and into the bladder.  Full   cystoscopy was performed, which showed hyperemic bladder.  No other   abnormalities.  The left ureteral orifice was noted to be narrow in appearance.    This was attempted to be cannulated with a 5-Pakistani open-ended ureteral   catheter for retrograde pyelogram, but was unable to gain access without the use   of a sensor wire.  The wire was able to allow placement of the ureteral   catheter and a retrograde pyelogram was performed.  Two radiopaque stones were   noted in the mid ureter with mild proximal hydroureteronephrosis.  No other   abnormalities or filling defects were noted throughout the entirety of the   retrograde pyelogram.  The sensor wire was passed into the left ureteral orifice   and up  to the level of the kidney.  A dual-lumen exchange catheter was then   placed over this to allow for dilation of the ureteral orifice to allow access   of the ureteroscope.  Once dilation was performed, the dual-lumen exchange   catheter was removed and the wire was left in place and the bladder was drained.    The cystoscope was removed.    A semi-rigid ureteroscope was then passed per urethra and into the bladder.  The   left ureteral orifice was cannulated with the ureteroscope and with careful   navigation the ureteroscope was passed to the level of the first kidney stone.    There was noted to be a narrow ureter throughout with some difficulty in gaining   access to the stone.  A 200 micron laser fiber was then used to fragment the   stone and a ZeroTip nitinol basket was used to grasp the stone fragments and   deposited them into the bladder.  The ureteroscope was replaced back into the   ureter and a second stone was encountered proximal to the original stone that   was located.  This also was then fragmented with 200 micron laser fiber and then   basketing of the stone was performed to remove all fragments.  The final look   of the ureter showed no stones in the mid ureter.  The semi-rigid ureteroscope   was unable to be passed more proximally due to the narrow aspect of the ureter.    No further fragments were able to be visualized both with direct visualization   and with fluoroscopy.    The ureteroscope was then removed in its entirety and the cystoscope was   replaced back into the bladder and all stones were cleared from the bladder to   be sent for specimen.  The cystoscope was then replaced back into the bladder   over the wire and a 6-Honduran x 24 cm double-J ureteral stent on a string was   then passed over the wire and up to the level of the kidney as confirmed by   fluoroscopy.  The wire was then removed and good curls noted in the proximal and   distal portion of the stent.  The bladder was then  drained a final time and the   cystoscope was carefully removed.  The string of the stent was secured to the   patient's penis with the Tegaderm dressing.    The patient was then awakened from general anesthesia and transferred to PACU in   stable condition.    COMPLICATIONS:  None.    FINDINGS:  Narrow ureter throughout.  Two radiopaque stones in the mid ureter   that were fragmented and removed.    ESTIMATED BLOOD LOSS:  5 mL.    DRAINS:  A 6-Arabic x 24 cm double-J ureteral stent on a string in the left   ureter.    SPECIMENS:  Left ureteral stones.    PATIENT DISPOSITION:  The patient is stable and deemed appropriate for discharge   home.  He will follow up in approximately 2 to 3 weeks for postoperative check.    He was instructed to remove his stent by pulling the string on Wednesday,   09/26/2018.      EKP/IN  dd: 09/21/2018 12:31:03 (CDT)  td: 09/21/2018 14:33:19 (CDT)  Doc ID   #[id number]  Job ID #[id number]    CC:

## 2018-09-21 NOTE — BRIEF OP NOTE
[facility]  Brief Operative Note     SUMMARY     Surgery Date: 9/21/2018     Surgeon(s) and Role:     * Mckenzie Lu MD - Primary    Assisting Surgeon: None    Pre-op Diagnosis:  Nephrolithiasis [N20.0]    Post-op Diagnosis:  Post-Op Diagnosis Codes:     * Nephrolithiasis [N20.0]    Procedure(s) (LRB):  Cystoscopy, left retrograde pyelogram, left ureteroscopy with laser lithotripsy, stone extraction, placement of ureteral stent (Left)    Anesthesia: Choice    Description of the findings of the procedure: Two radio-opaque stones in mid ureter. Ureter noted to be narrow throughout. Stones fragmented and removed.     Findings/Key Components: 6Fr x 24cm JJ stent on string in L ureter    Estimated Blood Loss: 5cc         Specimens:   Specimen (12h ago, onward)    Start     Ordered    09/21/18 1217  Specimen to Pathology - Surgery  Once     Comments:  Left ureteral stones      09/21/18 1224          Discharge Note    SUMMARY     Admit Date: 9/21/2018    Discharge Date and Time:  09/21/2018 10:46 AM    Hospital Course (synopsis of major diagnoses, care, treatment, and services provided during the course of the hospital stay): Uncomplicated L URS     Final Diagnosis: Post-Op Diagnosis Codes:     * Nephrolithiasis [N20.0]    Disposition: Home or Self Care    Follow Up/Patient Instructions:     Medications:  Reconciled Home Medications:      Medication List      START taking these medications    phenazopyridine 100 MG tablet  Commonly known as:  PYRIDIUM  Take 2 tablets (200 mg total) by mouth 3 (three) times daily as needed for Pain.        CHANGE how you take these medications    * oxyCODONE-acetaminophen 7.5-325 mg per tablet  Commonly known as:  PERCOCET  TK 1 T PO Q 6 H PRN P  What changed:  Another medication with the same name was added. Make sure you understand how and when to take each.     * oxyCODONE-acetaminophen 5-325 mg per tablet  Commonly known as:  PERCOCET  Take 1 tablet by mouth  every 4 (four) hours as needed for Pain.  What changed:  You were already taking a medication with the same name, and this prescription was added. Make sure you understand how and when to take each.         * This list has 2 medication(s) that are the same as other medications prescribed for you. Read the directions carefully, and ask your doctor or other care provider to review them with you.            CONTINUE taking these medications    oxyCODONE 5 mg Cap  Commonly known as:  OXY-IR  Take 1 capsule (5 mg total) by mouth every 4 (four) hours as needed for Pain.     tamsulosin 0.4 mg Cap  Commonly known as:  FLOMAX  Take 1 capsule (0.4 mg total) by mouth once daily. for 14 days          Discharge Procedure Orders   Diet general     Call MD for:  temperature >100.4     Call MD for:  persistent nausea and vomiting     Call MD for:  severe uncontrolled pain     Call MD for:  difficulty breathing, headache or visual disturbances     No dressing needed     Activity as tolerated     Follow-up Information     Mckenzie Lu MD In 2 weeks.    Specialty:  Urology  Why:  For post-op follow up  Contact information:  [address]  [address]  [facility] [id number] [phone]                   #[id number]

## 2018-09-21 NOTE — DISCHARGE INSTRUCTIONS
Your stones were fragmented and removed completely.       Expect blood and/or burning with urination. Drink plenty of fluid to keep bladder flushed.    **Remove stent by removing tape and pulling string on the morning of Wednesday 9/26/18. Do not cut string. Expect to see a long, thin tube with a curl on each end attached to the string. Call with issues or if stent does not come out.**        ACTIVITY LEVEL: If you have received sedation or an anesthetic, you may feel sleepy for several hours. Rest until you are more awake. Gradually resume your normal activities.       DIET: You may resume your home diet. If nausea is present, increase your diet gradually with fluids and bland foods.      Medications: Pain medication should be taken only if needed and as directed. If antibiotics are prescribed, the medication should be taken until completed. You will be given an updated list of you medications.  ? No driving, alcoholic beverages or signing legal documents for next 24 hours or while taking pain medication        CALL THE DOCTOR:       · Fever over 101°F  · Severe pain that doesnt go away with medication.  · Upset stomach and vomiting that is persistent.  · Problems urinating-unable to urinate or heavy bleeding (with or without clots)          Fall Prevention  Millions of people fall every year and injure themselves. You may have had anesthesia or sedation which may increase your risk of falling. You may have health issues that put you at an increased risk of falling.     Here are ways to reduce your risk of falling.  ·   · Make your home safe by keeping walkways clear of objects you may trip over.  · Use non-slip pads under rugs. Do not use area rugs or small throw rugs.  · Use non-slip mats in bathtubs and showers.  · Install handrails and lights on staircases.  · Do not walk in poorly lit areas.  · Do not stand on chairs or wobbly ladders.  · Use caution when reaching overhead or looking upward. This position can  cause a loss of balance.  · Be sure your shoes fit properly, have non-slip bottoms and are in good condition.   · Wear shoes both inside and out. Avoid going barefoot or wearing slippers.  · Be cautious when going up and down stairs, curbs, and when walking on uneven sidewalks.  · If your balance is poor, consider using a cane or walker.  · If your fall was related to alcohol use, stop or limit alcohol intake.   · If your fall was related to use of sleeping medicines, talk to your doctor about this. You may need to reduce your dosage at bedtime if you awaken during the night to go to the bathroom.    · To reduce the need for nighttime bathroom trips:  ¨ Avoid drinking fluids for several hours before going to bed  ¨ Empty your bladder before going to bed  ¨ Men can keep a urinal at the bedside  · Stay as active as you can. Balance, flexibility, strength, and endurance all come from exercise. They all play a role in preventing falls. Ask your healthcare provider which types of activity are right for you.  · Get your vision checked on a regular basis.  · If you have pets, know where they are before you stand up or walk so you don't trip over them.  Use night lights.  Ureteral Stents  A ureteral stent is a soft plastic tube with holes in it. Its temporarily inserted into a ureter to help drain urine into the bladder. One end goes in the kidney. The other end goes in the bladder. A coil on each end holds the stent in place. The stent cant be seen from outside the body. It shouldnt interfere with your normal routine. Your stent will be put in by a doctor trained in treating the urinary tract (a urologist) or another specialist. The procedure is done in a hospital or surgery center. Youll likely go home the same day.  When is a ureteral stent used?  A ureteral stent may be used:  To bypass a blockage in a kidney or ureter.  During kidney stone removal.  To let a ureter heal after surgery.    Before the Procedure  Your  healthcare provider will give you instructions to prepare for the procedure. X-rays or other imaging tests of your kidneys and ureters may be done beforehand.  During the procedure  You receive medicine to prevent pain and help you relax or sleep during the procedure. Once this takes effect, the procedure starts.  The doctor inserts a cystoscope (lighted instrument) through the urethra and into the bladder. This shows the opening to the ureter.  A thin wire is carefully threaded through the cystoscope, up the ureter, and into the kidney. The stent is inserted over the wire.  A fluoroscope (special X-ray machine) is used to help position the stent. When the stent is in place, the wire and cystoscope are removed.  While you have a stent  Some discomfort is normal. Certain movements may trigger pain or a feeling that you need to urinate. You may also feel mild soreness or pressure before or during urination. These symptoms will go away a few days after the stent is removed.  Medicine to control pain or bladder spasms or to prevent infection may be prescribed. Take this as directed.  Drink plenty of fluids to help flush out your urinary tract.  Your urine may be slightly pink or red. This is due to bleeding caused by minor irritation from the stent. This may happen on and off while you have the stent.  As with any synthetic device placed in the body, there is a risk of infection. The stent may have to be removed if this happens.   How long will you need a stent?  The stent is often taken out after the blockage in the ureter is treated or the ureter has healed. This may take 1 week to 2 weeks, or longer. If a stent is needed for a long time, it may need to be changed every few months.  When to call your healthcare provider  Contact your healthcare provider right away if:  Your urine contains blood clots or you see a large amount of blood-tinged urine  You have symptoms similar to those you had before the stent was  placed  You constantly leak urine  You have a fever over 100.4°F (38°C), chills, nausea, or vomiting  Your pain is not relieved with medicine  The end of the stent comes out of the urethra   Date Last Reviewed: 1/1/2017  © 7770-2259 [employer]. [address] [place] [address]. All rights reserved. This information is not intended as a substitute for professional medical care. Always follow your healthcare professional's instructions.

## 2018-09-24 ENCOUNTER — TELEPHONE (OUTPATIENT)
Dept: UROLOGY | Facility: CLINIC | Age: 43
End: 2018-09-24

## 2018-09-24 RX ORDER — PHENAZOPYRIDINE HYDROCHLORIDE 100 MG/1
200 TABLET, FILM COATED ORAL 3 TIMES DAILY PRN
Qty: 18 TABLET | Refills: 0 | Status: SHIPPED | OUTPATIENT
Start: 2018-09-24 | End: 2018-10-18 | Stop reason: ALTCHOICE

## 2018-09-24 RX ORDER — PHENAZOPYRIDINE HYDROCHLORIDE 200 MG/1
200 TABLET, FILM COATED ORAL 3 TIMES DAILY PRN
Qty: 9 TABLET | Refills: 0 | Status: SHIPPED | OUTPATIENT
Start: 2018-09-24 | End: 2018-10-04

## 2018-09-24 NOTE — TELEPHONE ENCOUNTER
Explain to miah at prescription pad  is still seeing patient once she answers her message we will contact patient also pt was made aware of this.-jaimie

## 2018-09-24 NOTE — TELEPHONE ENCOUNTER
Spoke to pt advised pyridium sent to pharmacy but now pt is asking for something for constitpation I advised otc meds pt states he tried some would perfer an rx for this.please advise-jaimie

## 2018-09-24 NOTE — ANESTHESIA POSTPROCEDURE EVALUATION
Anesthesia Post Evaluation    Patient: Tito Barrios    Procedure(s) Performed: Procedure(s) (LRB):  Cystoscopy, possible retrograde pyelogram, ureteroscopy with laser lithotripsy, stone basketing, placement of ureteral stent (Left)    Final Anesthesia Type: general  Patient location during evaluation: PACU  Patient participation: Yes- Able to Participate  Level of consciousness: awake and alert and oriented  Post-procedure vital signs: reviewed and stable  Pain management: adequate  Airway patency: patent  PONV status at discharge: No PONV  Anesthetic complications: no      Cardiovascular status: blood pressure returned to baseline and hemodynamically stable  Respiratory status: unassisted, spontaneous ventilation and room air  Hydration status: euvolemic  Follow-up not needed.        Visit Vitals  BP (!) 149/91 (BP Location: Right arm, Patient Position: Lying)   Pulse 66   Temp 36.5 °C (97.7 °F) (Oral)   Resp 17   Wt 78.5 kg (173 lb 1 oz)   SpO2 98%   BMI 29.71 kg/m²       Pain/Cass Score: No Data Recorded

## 2018-09-26 ENCOUNTER — TELEPHONE (OUTPATIENT)
Dept: UROLOGY | Facility: CLINIC | Age: 43
End: 2018-09-26

## 2018-09-26 LAB
ANNOTATION COMMENT IMP: NORMAL
COMPN STONE: NORMAL
SPECIMEN SOURCE: NORMAL
STONE ANALYSIS IR-IMP: NORMAL

## 2018-09-26 NOTE — TELEPHONE ENCOUNTER
Please advise if a medication for constipation was to be called into [facility]cription [facility]. Patient is also requesting a work excuse to return to work Friday. Please let me know if this is appropriate.

## 2018-09-26 NOTE — TELEPHONE ENCOUNTER
I did not call anything into pharmacy for constipation. What is he requesting? All constipation medications I recommend are over the counter (Miralax, Milk of magnesia, Ducolax, etc).     Okay to given work note to return to work on Friday.

## 2018-09-26 NOTE — TELEPHONE ENCOUNTER
----- Message from Livia Olivas sent at 9/26/2018  1:51 PM CDT -----  Contact: [phone]  Pt is checking on the status on a script requesting for constipation, Also requesting a work note to return to work on Friday 9-28-18. Pls call pt [phone]. Thanks........Linda

## 2018-09-26 NOTE — TELEPHONE ENCOUNTER
Okay to use Colace for stool softener. Keep well hydrated.     If no bowel movement for 2 days, start adding these medications:   1. Miralax powder daily   2. Dulcolax suppository twice a day as needed    If no improvement, can add these to encourage bowel movement:  1. Milk of Magnesia orally twice a day  2. Magnesium citrate 1/2-1 bottle orally  3. Fleets enema    All these medications are available over the counter. Okay to use generic versions.

## 2018-10-18 ENCOUNTER — OFFICE VISIT (OUTPATIENT)
Dept: UROLOGY | Facility: CLINIC | Age: 43
End: 2018-10-18
Payer: COMMERCIAL

## 2018-10-18 VITALS
DIASTOLIC BLOOD PRESSURE: 76 MMHG | SYSTOLIC BLOOD PRESSURE: 118 MMHG | RESPIRATION RATE: 14 BRPM | BODY MASS INDEX: 29.66 KG/M2 | WEIGHT: 173.75 LBS | HEART RATE: 68 BPM | HEIGHT: 64 IN

## 2018-10-18 DIAGNOSIS — N20.0 NEPHROLITHIASIS: Primary | ICD-10-CM

## 2018-10-18 LAB
BILIRUB SERPL-MCNC: NEGATIVE MG/DL
BLOOD URINE, POC: NEGATIVE
COLOR, POC UA: YELLOW
GLUCOSE UR QL STRIP: NEGATIVE
KETONES UR QL STRIP: NEGATIVE
LEUKOCYTE ESTERASE URINE, POC: NEGATIVE
NITRITE, POC UA: NEGATIVE
PH, POC UA: 6
PROTEIN, POC: NEGATIVE
SPECIFIC GRAVITY, POC UA: 1020
UROBILINOGEN, POC UA: NEGATIVE

## 2018-10-18 PROCEDURE — 81002 URINALYSIS NONAUTO W/O SCOPE: CPT | Mod: S$GLB,,, | Performed by: UROLOGY

## 2018-10-18 PROCEDURE — 99999 PR PBB SHADOW E&M-EST. PATIENT-LVL III: CPT | Mod: PBBFAC,,, | Performed by: UROLOGY

## 2018-10-18 PROCEDURE — 99213 OFFICE O/P EST LOW 20 MIN: CPT | Mod: 25,S$GLB,, | Performed by: UROLOGY

## 2018-10-18 RX ORDER — CETIRIZINE HYDROCHLORIDE 10 MG/1
10 TABLET ORAL DAILY
COMMUNITY

## 2018-10-18 NOTE — PATIENT INSTRUCTIONS
"1. Eat fewer high-oxalate foods.  The first suggestion is the most obvious. The more oxalate that is absorbed from your digestive tract, the more oxalate in your urine. High-oxalate foods to limit, if you eat them, are:   Spinach   Bran flakes   Rhubarb   Beets   Potato chips   French fries   Nuts and nut butters   You do not need to cut out other healthy foods that provide some oxalate. In fact, oxalate is practically unavoidable, because most plant foods have some. Often a combination of calcium from foods or beverages with meals and fewer high-oxalate foods is required.    2. Increase the amount of calcium in your diet.  Low amounts of calcium in your diet will increase your chances of forming calcium oxalate kidney stones. Many people are afraid to eat calcium because of the name "calcium oxalate stones." However, calcium binds oxalate in the intestines. A diet rich in calcium helps reduce the amount of oxalate being absorbed by your body, so stones are less likely to form. Eat calcium rich foods and beverages every day (2 to 3 servings) from dairy foods or other calcium-rich foods.   Also, eating high calcium foods at the same time as high oxalate food is helpful; for example have low fat cheese with a spinach salad or yogurt with berries. If you take a calcium supplement, calcium citrate is the preferred form.    3. Limit the vitamin C content of your diet.  Oxalate is produced as an end product of Vitamin C (ascorbic acid) metabolism. Large doses of Vitamin C may increase the amount of oxalate in your urine, increasing the risk of kidney stone formation. If you are taking a supplement, do not take more than 500 mg of Vitamin C daily.    4. Drink the right amount of fluids every day.  It is very important to drink plenty of liquids. Your goal should be 10-12 glasses a day. At least 5-6 glasses should be water. You may also want to consider drinking lemonade. Research suggests that lemonade may be helpful in " reducing the risk of calcium oxalate stone formation.    5. Eating the right amount of protein daily.  Eating large amounts of protein may increase the risk of kidney stone formation. Your daily protein needs can usually be met with 2-3 servings a day, or 4 to 6 ounces. Eating more than this if you are at risk at kidney stones is unnecessary.    6. Reduce the amount of sodium in your diet.  Reduce the amount of sodium in your diet to 2-3 grams per day. Limit eating processed foods such as hot dogs, deli meats, sausage, canned products, dry soup mixes, sauerkraut, pickles, and various convenience mixes.

## 2018-10-18 NOTE — PROGRESS NOTES
"Subjective:       Tito Barrios is a 43 y.o. male who is an established patient who was referred by Dr Barrios for evaluation of nephrolithiasis.      He was seen in ER 9/13/18 and 9/14/18 with L flank pain. CT showed two 5mm stones in L mid ureter with mild proximal hydronephrosis. Punctate R renal stone.     No prior h/o stones. Pain has been well controlled since last ER visit. He has been straining his urine without any stones noted.    He is now s/p L URS/LL/stent on string on 9/21/18. Stones removed. Ureter was noted to be narrow.     Stone: CaOx mono 100%      The following portions of the patient's history were reviewed and updated as appropriate: allergies, current medications, past family history, past medical history, past social history, past surgical history and problem list.    Review of Systems  Constitutional: no fever or chills  ENT: no nasal congestion or sore throat  Respiratory: no cough or shortness of breath  Cardiovascular: no chest pain or palpitations  Gastrointestinal: no nausea or vomiting, tolerating diet  Genitourinary: as per HPI  Hematologic/Lymphatic: no easy bruising or lymphadenopathy  Musculoskeletal: no arthralgias or myalgias  Skin: no rashes or lesions  Neurological: no seizures or tremors  Behavioral/Psych: no auditory or visual hallucinations       Objective:    Vitals: /76   Pulse 68   Resp 14   Ht 5' 4" (1.626 m)   Wt 78.8 kg (173 lb 11.6 oz)   BMI 29.82 kg/m²     Physical Exam   General: well developed, well nourished in no acute distress  Head: normocephalic, atraumatic  Neck: supple, trachea midline, no obvious enlargement of thyroid  HEENT: EOMI, mucus membranes moist, sclera anicteric, no hearing impairment  Lungs: symmetric expansion, non-labored breathing  Cardiovascular: regular rate and rhythm, normal pulses  Abdomen: soft, non tender, non distended, no palpable masses, no hepatosplenomegaly, no hernias, bladder nonpalpable. No CVA " tenderness.  Musculoskeletal: no peripheral edema, normal ROM in bilateral upper and lower extremities  Lymphatics: no cervical or inguinal lymphadenopathy  Skin: no rashes or lesions  Neuro: alert and oriented x 3, no gross deficits  Psych: normal judgment and insight, normal mood/affect and non-anxious  Genitourinary:   patient declined exam   AMANDA: patient declined exam      Lab Review   Urine analysis today in clinic shows Negative     Lab Results   Component Value Date    WBC 7.88 09/21/2018    HGB 15.1 09/21/2018    HCT 44.7 09/21/2018    MCV 87 09/21/2018     09/21/2018     No results found for: CREATININE, BUN  No results found for: PSA  No results found for: PSADIAG    Imaging  CT reviewed  Reports and images were personally reviewed by me and discussed with the patient today       Assessment/Plan:      1. Nephrolithiasis    -Two 5mm stones in mid L ureter   - Doubt spontaneous passage   - Flomax   - Offered URS/LL for stone treatment.   - OR 9/21/18 for L URS/LL/stent. Stones removed.   - CaOx mono   - RAFAEL 3 weeks   - Likely annual surveillance after that. Known punctate stone in R MP.   - Discussed stone prevention     2. Left flank pain    - 2/2 L ureteral stones       Follow up in 4 weeks w RAFAEL

## 2019-12-11 ENCOUNTER — OFFICE VISIT (OUTPATIENT)
Dept: FAMILY MEDICINE | Facility: CLINIC | Age: 44
End: 2019-12-11
Payer: COMMERCIAL

## 2019-12-11 VITALS
RESPIRATION RATE: 18 BRPM | SYSTOLIC BLOOD PRESSURE: 162 MMHG | HEART RATE: 72 BPM | BODY MASS INDEX: 31.57 KG/M2 | HEIGHT: 64 IN | OXYGEN SATURATION: 98 % | WEIGHT: 184.94 LBS | TEMPERATURE: 98 F | DIASTOLIC BLOOD PRESSURE: 94 MMHG

## 2019-12-11 DIAGNOSIS — I10 HYPERTENSION, ESSENTIAL: Primary | ICD-10-CM

## 2019-12-11 DIAGNOSIS — Z13.220 SCREENING FOR HYPERLIPIDEMIA: ICD-10-CM

## 2019-12-11 DIAGNOSIS — H00.14 CHALAZION OF LEFT UPPER EYELID: ICD-10-CM

## 2019-12-11 PROCEDURE — 93010 ELECTROCARDIOGRAM REPORT: CPT | Mod: S$GLB,,, | Performed by: INTERNAL MEDICINE

## 2019-12-11 PROCEDURE — 93005 ELECTROCARDIOGRAM TRACING: CPT | Mod: S$GLB,,, | Performed by: INTERNAL MEDICINE

## 2019-12-11 PROCEDURE — 99999 PR PBB SHADOW E&M-EST. PATIENT-LVL IV: ICD-10-PCS | Mod: PBBFAC,,, | Performed by: INTERNAL MEDICINE

## 2019-12-11 PROCEDURE — 93005 EKG 12-LEAD: ICD-10-PCS | Mod: S$GLB,,, | Performed by: INTERNAL MEDICINE

## 2019-12-11 PROCEDURE — 93010 EKG 12-LEAD: ICD-10-PCS | Mod: S$GLB,,, | Performed by: INTERNAL MEDICINE

## 2019-12-11 PROCEDURE — 99999 PR PBB SHADOW E&M-EST. PATIENT-LVL IV: CPT | Mod: PBBFAC,,, | Performed by: INTERNAL MEDICINE

## 2019-12-11 PROCEDURE — 99203 PR OFFICE/OUTPT VISIT, NEW, LEVL III, 30-44 MIN: ICD-10-PCS | Mod: S$GLB,,, | Performed by: INTERNAL MEDICINE

## 2019-12-11 PROCEDURE — 99203 OFFICE O/P NEW LOW 30 MIN: CPT | Mod: S$GLB,,, | Performed by: INTERNAL MEDICINE

## 2019-12-11 RX ORDER — AMLODIPINE BESYLATE 10 MG/1
10 TABLET ORAL DAILY
Qty: 30 TABLET | Refills: 11 | Status: SHIPPED | OUTPATIENT
Start: 2019-12-11 | End: 2020-12-10

## 2019-12-11 RX ORDER — DOXYCYCLINE HYCLATE 100 MG
100 TABLET ORAL 2 TIMES DAILY
Qty: 14 TABLET | Refills: 0 | Status: SHIPPED | OUTPATIENT
Start: 2019-12-11 | End: 2020-01-10 | Stop reason: ALTCHOICE

## 2019-12-11 NOTE — PROGRESS NOTES
"Subjective:       Patient ID: Tito Barrios is a 44 y.o. male.    Chief Complaint: Establish Care and Eye Problem (bump on L side eye/ 4 months was small now bigger)    Est PCP discuss high blood pressue    HPI: 45 y/o with history o nephrolithiasis presents to establish PCP. Has checked blood pressure several times over last two months with readings consistently > 140/90. Occasionally feels light headed no palpitations no syncope no dyspnea no LE swelling no vision changes. Has never taken medicaiton for high blood pressure. No tobacco. Rare EtoH. Not following any paticular diet. No regular physical exercise.    Also has had a "lump" of left upper eye lid x one month. Has used topical drops to eye (tobrimycin) given to him by friend without change. No pain o drainage. No crusting of eye    SH: works as  works night shift     FamHx: No family history of early CAD no thyroid disease    Review of Systems   Constitutional: Negative for activity change, appetite change, fatigue, fever and unexpected weight change.   HENT: Negative for ear pain, rhinorrhea and sore throat.    Eyes: Negative for discharge and visual disturbance.   Respiratory: Negative for chest tightness, shortness of breath and wheezing.    Cardiovascular: Negative for chest pain, palpitations and leg swelling.   Gastrointestinal: Negative for abdominal pain, constipation and diarrhea.   Endocrine: Negative for cold intolerance and heat intolerance.   Genitourinary: Negative for dysuria and hematuria.   Musculoskeletal: Negative for joint swelling and neck stiffness.   Skin: Negative for rash.   Neurological: Negative for dizziness, syncope, weakness and headaches.   Psychiatric/Behavioral: Negative for suicidal ideas.       Objective:     Vitals:    12/11/19 1351 12/11/19 1410   BP: (!) 152/97 (!) 162/94   BP Location: Right arm    Patient Position: Sitting    BP Method: Medium (Automatic)    Pulse: 72 72   Resp: 18    Temp: 98.4 °F " "(36.9 °C)    TempSrc: Oral    SpO2: 98%    Weight: 83.9 kg (184 lb 15.5 oz)    Height: 5' 4" (1.626 m)           Physical Exam   Constitutional: He is oriented to person, place, and time. He appears well-developed and well-nourished.   HENT:   Head: Normocephalic and atraumatic.   Eyes: Pupils are equal, round, and reactive to light. Conjunctivae and EOM are normal. No scleral icterus.   Left upper eyelid 2mm firm nodule no fluctuance excess eyelid skin noted bilaterally   Neck: Normal range of motion.   Cardiovascular: Normal rate and regular rhythm. Exam reveals no gallop and no friction rub.   No murmur heard.  No LE edema   Pulmonary/Chest: Effort normal and breath sounds normal. He has no wheezes. He has no rales.   Abdominal: Soft. Bowel sounds are normal. There is no tenderness. There is no rebound and no guarding.   Musculoskeletal: Normal range of motion. He exhibits no edema or tenderness.   Neurological: He is alert and oriented to person, place, and time. No cranial nerve deficit.   Skin: Skin is warm and dry.   Psychiatric: He has a normal mood and affect.     EKG sinus rhythm no LVH rate 66  Assessment and Plan   1. Hypertension, essential  Star CCB bp check in two weeks follow up with me in one month labs for secondary causes  - CBC auto differential; Future  - Comprehensive metabolic panel; Future  - EKG 12-lead  - TSH; Future  - Urinalysis; Future  - amLODIPine (NORVASC) 10 MG tablet; Take 1 tablet (10 mg total) by mouth once daily.  Dispense: 30 tablet; Refill: 11    2. Screening for hyperlipidemia  Fasting lipid for risk stratification  - Comprehensive metabolic panel; Future  - Lipid panel; Future    3. Chalazion of left upper eyelid  Doxycyline BID x seven days warm compress tid  - doxycycline (VIBRA-TABS) 100 MG tablet; Take 1 tablet (100 mg total) by mouth 2 (two) times daily.  Dispense: 14 tablet; Refill: 0  "

## 2019-12-23 ENCOUNTER — LAB VISIT (OUTPATIENT)
Dept: LAB | Facility: HOSPITAL | Age: 44
End: 2019-12-23
Attending: INTERNAL MEDICINE
Payer: COMMERCIAL

## 2019-12-23 DIAGNOSIS — I10 HYPERTENSION, ESSENTIAL: ICD-10-CM

## 2019-12-23 DIAGNOSIS — Z13.220 SCREENING FOR HYPERLIPIDEMIA: ICD-10-CM

## 2019-12-23 LAB
ALBUMIN SERPL BCP-MCNC: 4.4 G/DL (ref 3.5–5.2)
ALP SERPL-CCNC: 111 U/L (ref 55–135)
ALT SERPL W/O P-5'-P-CCNC: 101 U/L (ref 10–44)
ANION GAP SERPL CALC-SCNC: 8 MMOL/L (ref 8–16)
AST SERPL-CCNC: 43 U/L (ref 10–40)
BASOPHILS # BLD AUTO: 0.04 K/UL (ref 0–0.2)
BASOPHILS NFR BLD: 0.5 % (ref 0–1.9)
BILIRUB SERPL-MCNC: 1.1 MG/DL (ref 0.1–1)
BUN SERPL-MCNC: 17 MG/DL (ref 6–20)
CALCIUM SERPL-MCNC: 9.9 MG/DL (ref 8.7–10.5)
CHLORIDE SERPL-SCNC: 105 MMOL/L (ref 95–110)
CHOLEST SERPL-MCNC: 232 MG/DL (ref 120–199)
CHOLEST/HDLC SERPL: 4.8 {RATIO} (ref 2–5)
CO2 SERPL-SCNC: 28 MMOL/L (ref 23–29)
CREAT SERPL-MCNC: 1.1 MG/DL (ref 0.5–1.4)
DIFFERENTIAL METHOD: NORMAL
EOSINOPHIL # BLD AUTO: 0.3 K/UL (ref 0–0.5)
EOSINOPHIL NFR BLD: 3.3 % (ref 0–8)
ERYTHROCYTE [DISTWIDTH] IN BLOOD BY AUTOMATED COUNT: 11.8 % (ref 11.5–14.5)
EST. GFR  (AFRICAN AMERICAN): >60 ML/MIN/1.73 M^2
EST. GFR  (NON AFRICAN AMERICAN): >60 ML/MIN/1.73 M^2
GLUCOSE SERPL-MCNC: 141 MG/DL (ref 70–110)
HCT VFR BLD AUTO: 49 % (ref 40–54)
HDLC SERPL-MCNC: 48 MG/DL (ref 40–75)
HDLC SERPL: 20.7 % (ref 20–50)
HGB BLD-MCNC: 16 G/DL (ref 14–18)
IMM GRANULOCYTES # BLD AUTO: 0.02 K/UL (ref 0–0.04)
IMM GRANULOCYTES NFR BLD AUTO: 0.3 % (ref 0–0.5)
LDLC SERPL CALC-MCNC: 147.8 MG/DL (ref 63–159)
LYMPHOCYTES # BLD AUTO: 2.8 K/UL (ref 1–4.8)
LYMPHOCYTES NFR BLD: 37.6 % (ref 18–48)
MCH RBC QN AUTO: 28.9 PG (ref 27–31)
MCHC RBC AUTO-ENTMCNC: 32.7 G/DL (ref 32–36)
MCV RBC AUTO: 89 FL (ref 82–98)
MONOCYTES # BLD AUTO: 0.7 K/UL (ref 0.3–1)
MONOCYTES NFR BLD: 9.1 % (ref 4–15)
NEUTROPHILS # BLD AUTO: 3.7 K/UL (ref 1.8–7.7)
NEUTROPHILS NFR BLD: 49.2 % (ref 38–73)
NONHDLC SERPL-MCNC: 184 MG/DL
NRBC BLD-RTO: 0 /100 WBC
PLATELET # BLD AUTO: 199 K/UL (ref 150–350)
PMV BLD AUTO: 10.9 FL (ref 9.2–12.9)
POTASSIUM SERPL-SCNC: 3.9 MMOL/L (ref 3.5–5.1)
PROT SERPL-MCNC: 7.7 G/DL (ref 6–8.4)
RBC # BLD AUTO: 5.53 M/UL (ref 4.6–6.2)
SODIUM SERPL-SCNC: 141 MMOL/L (ref 136–145)
TRIGL SERPL-MCNC: 181 MG/DL (ref 30–150)
TSH SERPL DL<=0.005 MIU/L-ACNC: 2.42 UIU/ML (ref 0.4–4)
WBC # BLD AUTO: 7.5 K/UL (ref 3.9–12.7)

## 2019-12-23 PROCEDURE — 80061 LIPID PANEL: CPT

## 2019-12-23 PROCEDURE — 85025 COMPLETE CBC W/AUTO DIFF WBC: CPT

## 2019-12-23 PROCEDURE — 36415 COLL VENOUS BLD VENIPUNCTURE: CPT | Mod: PN

## 2019-12-23 PROCEDURE — 80053 COMPREHEN METABOLIC PANEL: CPT

## 2019-12-23 PROCEDURE — 84443 ASSAY THYROID STIM HORMONE: CPT

## 2020-01-10 ENCOUNTER — OFFICE VISIT (OUTPATIENT)
Dept: FAMILY MEDICINE | Facility: CLINIC | Age: 45
End: 2020-01-10
Payer: COMMERCIAL

## 2020-01-10 VITALS
TEMPERATURE: 100 F | HEIGHT: 64 IN | BODY MASS INDEX: 31.09 KG/M2 | RESPIRATION RATE: 17 BRPM | WEIGHT: 182.13 LBS | DIASTOLIC BLOOD PRESSURE: 95 MMHG | OXYGEN SATURATION: 96 % | HEART RATE: 83 BPM | SYSTOLIC BLOOD PRESSURE: 132 MMHG

## 2020-01-10 DIAGNOSIS — R74.8 ELEVATED LIVER ENZYMES: Primary | ICD-10-CM

## 2020-01-10 DIAGNOSIS — I10 HYPERTENSION, ESSENTIAL: ICD-10-CM

## 2020-01-10 DIAGNOSIS — H00.14 CHALAZION OF LEFT UPPER EYELID: ICD-10-CM

## 2020-01-10 DIAGNOSIS — R73.03 PRE-DIABETES: ICD-10-CM

## 2020-01-10 PROCEDURE — 99214 PR OFFICE/OUTPT VISIT, EST, LEVL IV, 30-39 MIN: ICD-10-PCS | Mod: S$GLB,,, | Performed by: INTERNAL MEDICINE

## 2020-01-10 PROCEDURE — 99999 PR PBB SHADOW E&M-EST. PATIENT-LVL IV: CPT | Mod: PBBFAC,,, | Performed by: INTERNAL MEDICINE

## 2020-01-10 PROCEDURE — 99214 OFFICE O/P EST MOD 30 MIN: CPT | Mod: S$GLB,,, | Performed by: INTERNAL MEDICINE

## 2020-01-10 PROCEDURE — 99999 PR PBB SHADOW E&M-EST. PATIENT-LVL IV: ICD-10-PCS | Mod: PBBFAC,,, | Performed by: INTERNAL MEDICINE

## 2020-01-10 NOTE — PROGRESS NOTES
"Subjective:       Patient ID: Tito Barrios is a 44 y.o. male.    Chief Complaint: Establish Care; Follow-up; and Cough    F/u labs    HPI: 43 y/o w/ HTN presents for follow up. Since last visit completed doxycyline for left lid chalazion. Felt it did get somewhat smaller but still present no pain or discharge. Seen three days ago in urgent care for cough and fever and prescribed azithromycin feels cough is improving no wheezing no shortness of breath. Did not take amlodipine today because was not sure if he could take with antibiotics no LE swelling. No abdominal pain no nausea/vomitting    Review of Systems   Constitutional: Negative for activity change, appetite change, fatigue, fever and unexpected weight change.   HENT: Negative for ear pain, rhinorrhea and sore throat.    Eyes: Negative for discharge and visual disturbance.   Respiratory: Positive for cough. Negative for chest tightness, shortness of breath and wheezing.    Cardiovascular: Negative for chest pain, palpitations and leg swelling.   Gastrointestinal: Negative for abdominal pain, constipation and diarrhea.   Endocrine: Negative for cold intolerance and heat intolerance.   Genitourinary: Negative for dysuria and hematuria.   Musculoskeletal: Negative for joint swelling and neck stiffness.   Skin: Negative for rash.   Neurological: Negative for dizziness, syncope, weakness and headaches.   Psychiatric/Behavioral: Negative for suicidal ideas.       Objective:     Vitals:    01/10/20 1607   BP: (!) 132/95   BP Location: Right arm   Patient Position: Sitting   Pulse: 83   Resp: 17   Temp: 100.1 °F (37.8 °C)   TempSrc: Oral   SpO2: 96%   Weight: 82.6 kg (182 lb 1.6 oz)   Height: 5' 4" (1.626 m)          Physical Exam   Constitutional: He is oriented to person, place, and time. He appears well-developed and well-nourished.   HENT:   Head: Normocephalic and atraumatic.   Eyes: Conjunctivae are normal. No scleral icterus.       Neck: Normal range of " motion.   Cardiovascular: Normal rate and regular rhythm. Exam reveals no gallop and no friction rub.   No murmur heard.  Pulmonary/Chest: Effort normal and breath sounds normal. He has no wheezes. He has no rales.   Abdominal: Soft. Bowel sounds are normal. There is no tenderness. There is no rebound and no guarding.   Musculoskeletal: Normal range of motion. He exhibits no edema or tenderness.   Neurological: He is alert and oriented to person, place, and time. No cranial nerve deficit.   Skin: Skin is warm and dry.   Psychiatric: He has a normal mood and affect.       Assessment and Plan   1. Elevated liver enzymes  Labs for viral causes suspect MURILLO in light of elevated glucsoe and lipids  - Hepatitis panel, acute; Future  - Comprehensive metabolic panel; Future    2. Pre-diabetes  Would like to trial life style modificaitons limit white rices concentrated sweets quantify with a1c and follow up one month  - Hemoglobin A1c; Future    3. Chalazion of left upper eyelid  Referral to optometry for DFE for #2 and consideration of excision  - Ambulatory referral to Optometry

## 2020-01-14 ENCOUNTER — PATIENT OUTREACH (OUTPATIENT)
Dept: ADMINISTRATIVE | Facility: OTHER | Age: 45
End: 2020-01-14

## 2020-01-16 ENCOUNTER — OFFICE VISIT (OUTPATIENT)
Dept: OPTOMETRY | Facility: CLINIC | Age: 45
End: 2020-01-16
Payer: COMMERCIAL

## 2020-01-16 DIAGNOSIS — H00.14 CHALAZION LEFT UPPER EYELID: Primary | ICD-10-CM

## 2020-01-16 PROCEDURE — 92002 INTRM OPH EXAM NEW PATIENT: CPT | Mod: S$GLB,,, | Performed by: OPTOMETRIST

## 2020-01-16 PROCEDURE — 99999 PR PBB SHADOW E&M-EST. PATIENT-LVL II: ICD-10-PCS | Mod: PBBFAC,,, | Performed by: OPTOMETRIST

## 2020-01-16 PROCEDURE — 92002 PR EYE EXAM, NEW PATIENT,INTERMED: ICD-10-PCS | Mod: S$GLB,,, | Performed by: OPTOMETRIST

## 2020-01-16 PROCEDURE — 99999 PR PBB SHADOW E&M-EST. PATIENT-LVL II: CPT | Mod: PBBFAC,,, | Performed by: OPTOMETRIST

## 2020-01-16 NOTE — PROGRESS NOTES
Subjective:       Patient ID: Tito Barrios is a 44 y.o. male      Chief Complaint   Patient presents with    Eye Problem     History of Present Illness  Dls: ? Yrs     45 y/o male presents today with c/o bump on NATY x 2-3 months was seen by family dr.  Was given antibotics PO no improvement want back and gave him tobramycin gtts still no improvement. Pt c/o burning os off/on no itching no pain no mucous.         Assessment/Plan:     1. Chalazion left upper eyelid  Referral to Dr. Wallace for excision. Can d/c abx drops. WC PRN   - Ambulatory referral to Ophthalmology    Follow up for Dr. Wallace chalazion exicision.

## 2020-01-16 NOTE — LETTER
January 16, 2020      Alexis Roger MD  [address]  [place] [address]           [facility]  [address]  [place] [address]  Phone: [phone]  Fax: [phone]          Patient: Tito Barrios   MR Number: [id number]   YOB: 1975   Date of Visit: 1/16/2020       Dear Dr. Alexis Roger:    Thank you for referring Tito Barrios to me for evaluation. Attached you will find relevant portions of my assessment and plan of care.    If you have questions, please do not hesitate to call me. I look forward to following Tito Barrios along with you.    Sincerely,    Jennifer Walker, OD    Enclosure  CC:  No Recipients    If you would like to receive this communication electronically, please contact [email or web address] or [phone] to request more information on [employer] access.    For providers and/or their staff who would like to refer a patient to [facility], please contact us through our one-stop-shop provider referral line, [facility], at [phone].    If you feel you have received this communication in error or would no longer like to receive these types of communications, please e-mail [email or web address]

## 2021-02-10 DIAGNOSIS — I10 HYPERTENSION, ESSENTIAL: ICD-10-CM

## 2022-05-11 ENCOUNTER — HOSPITAL ENCOUNTER (EMERGENCY)
Facility: HOSPITAL | Age: 47
Discharge: HOME | End: 2022-05-11
Attending: EMERGENCY MEDICINE
Payer: COMMERCIAL

## 2022-05-11 VITALS
BODY MASS INDEX: 27.46 KG/M2 | RESPIRATION RATE: 16 BRPM | DIASTOLIC BLOOD PRESSURE: 114 MMHG | TEMPERATURE: 99 F | OXYGEN SATURATION: 96 % | HEART RATE: 71 BPM | SYSTOLIC BLOOD PRESSURE: 187 MMHG | WEIGHT: 160 LBS

## 2022-05-11 DIAGNOSIS — M62.838 NECK MUSCLE SPASM: ICD-10-CM

## 2022-05-11 DIAGNOSIS — M54.2 NECK PAIN: ICD-10-CM

## 2022-05-11 DIAGNOSIS — M54.12 CERVICAL RADICULOPATHY, ACUTE: Primary | ICD-10-CM

## 2022-05-11 DIAGNOSIS — I10 HYPERTENSION: ICD-10-CM

## 2022-05-11 DIAGNOSIS — M54.2 NECK PAIN ON LEFT SIDE: ICD-10-CM

## 2022-05-11 LAB
ALBUMIN SERPL BCP-MCNC: 3.8 G/DL (ref 3.5–5.2)
ALP SERPL-CCNC: 116 U/L (ref 55–135)
ALT SERPL W/O P-5'-P-CCNC: 88 U/L (ref 10–44)
ANION GAP SERPL CALC-SCNC: 7 MMOL/L (ref 8–16)
AST SERPL-CCNC: 46 U/L (ref 10–40)
BACTERIA #/AREA URNS HPF: NORMAL /HPF
BASOPHILS # BLD AUTO: 0.04 K/UL (ref 0–0.2)
BASOPHILS NFR BLD: 0.5 % (ref 0–1.9)
BILIRUB SERPL-MCNC: 0.5 MG/DL (ref 0.1–1)
BILIRUB UR QL STRIP: NEGATIVE
BUN SERPL-MCNC: 12 MG/DL (ref 6–20)
CALCIUM SERPL-MCNC: 9.3 MG/DL (ref 8.7–10.5)
CHLORIDE SERPL-SCNC: 105 MMOL/L (ref 95–110)
CLARITY UR: CLEAR
CO2 SERPL-SCNC: 25 MMOL/L (ref 23–29)
COLOR UR: COLORLESS
CREAT SERPL-MCNC: 1.2 MG/DL (ref 0.5–1.4)
DIFFERENTIAL METHOD: NORMAL
EOSINOPHIL # BLD AUTO: 0.3 K/UL (ref 0–0.5)
EOSINOPHIL NFR BLD: 3.8 % (ref 0–8)
ERYTHROCYTE [DISTWIDTH] IN BLOOD BY AUTOMATED COUNT: 11.6 % (ref 11.5–14.5)
EST. GFR  (AFRICAN AMERICAN): >60 ML/MIN/1.73 M^2
EST. GFR  (NON AFRICAN AMERICAN): >60 ML/MIN/1.73 M^2
GLUCOSE SERPL-MCNC: 190 MG/DL (ref 70–110)
GLUCOSE UR QL STRIP: ABNORMAL
HCT VFR BLD AUTO: 48 % (ref 40–54)
HGB BLD-MCNC: 15.9 G/DL (ref 14–18)
HGB UR QL STRIP: NEGATIVE
IMM GRANULOCYTES # BLD AUTO: 0.02 K/UL (ref 0–0.04)
IMM GRANULOCYTES NFR BLD AUTO: 0.3 % (ref 0–0.5)
KETONES UR QL STRIP: NEGATIVE
LEUKOCYTE ESTERASE UR QL STRIP: NEGATIVE
LYMPHOCYTES # BLD AUTO: 1.8 K/UL (ref 1–4.8)
LYMPHOCYTES NFR BLD: 23.1 % (ref 18–48)
MCH RBC QN AUTO: 29.8 PG (ref 27–31)
MCHC RBC AUTO-ENTMCNC: 33.1 G/DL (ref 32–36)
MCV RBC AUTO: 90 FL (ref 82–98)
MICROSCOPIC COMMENT: NORMAL
MONOCYTES # BLD AUTO: 0.7 K/UL (ref 0.3–1)
MONOCYTES NFR BLD: 8.6 % (ref 4–15)
NEUTROPHILS # BLD AUTO: 5 K/UL (ref 1.8–7.7)
NEUTROPHILS NFR BLD: 63.7 % (ref 38–73)
NITRITE UR QL STRIP: NEGATIVE
NRBC BLD-RTO: 0 /100 WBC
PH UR STRIP: 7 [PH] (ref 5–8)
PLATELET # BLD AUTO: 194 K/UL (ref 150–450)
PMV BLD AUTO: 10.4 FL (ref 9.2–12.9)
POTASSIUM SERPL-SCNC: 4.2 MMOL/L (ref 3.5–5.1)
PROT SERPL-MCNC: 7.5 G/DL (ref 6–8.4)
PROT UR QL STRIP: NEGATIVE
RBC # BLD AUTO: 5.33 M/UL (ref 4.6–6.2)
SODIUM SERPL-SCNC: 137 MMOL/L (ref 136–145)
SP GR UR STRIP: 1.01 (ref 1–1.03)
TROPONIN I SERPL DL<=0.01 NG/ML-MCNC: <0.006 NG/ML (ref 0–0.03)
URN SPEC COLLECT METH UR: ABNORMAL
UROBILINOGEN UR STRIP-ACNC: NEGATIVE EU/DL
WBC # BLD AUTO: 7.8 K/UL (ref 3.9–12.7)
YEAST URNS QL MICRO: NORMAL

## 2022-05-11 PROCEDURE — 85025 COMPLETE CBC W/AUTO DIFF WBC: CPT | Performed by: EMERGENCY MEDICINE

## 2022-05-11 PROCEDURE — 99285 EMERGENCY DEPT VISIT HI MDM: CPT | Mod: 25

## 2022-05-11 PROCEDURE — 25500020 PHARM REV CODE 255: Performed by: EMERGENCY MEDICINE

## 2022-05-11 PROCEDURE — 25000003 PHARM REV CODE 250: Performed by: EMERGENCY MEDICINE

## 2022-05-11 PROCEDURE — 63600175 PHARM REV CODE 636 W HCPCS: Performed by: EMERGENCY MEDICINE

## 2022-05-11 PROCEDURE — 93010 EKG 12-LEAD: ICD-10-PCS | Mod: ,,, | Performed by: INTERNAL MEDICINE

## 2022-05-11 PROCEDURE — 84484 ASSAY OF TROPONIN QUANT: CPT | Performed by: EMERGENCY MEDICINE

## 2022-05-11 PROCEDURE — 81000 URINALYSIS NONAUTO W/SCOPE: CPT | Performed by: EMERGENCY MEDICINE

## 2022-05-11 PROCEDURE — 93005 ELECTROCARDIOGRAM TRACING: CPT

## 2022-05-11 PROCEDURE — 93010 ELECTROCARDIOGRAM REPORT: CPT | Mod: ,,, | Performed by: INTERNAL MEDICINE

## 2022-05-11 PROCEDURE — 80053 COMPREHEN METABOLIC PANEL: CPT | Performed by: EMERGENCY MEDICINE

## 2022-05-11 RX ORDER — METHYLPREDNISOLONE 4 MG/1
TABLET ORAL
Qty: 1 EACH | Refills: 0 | Status: SHIPPED | OUTPATIENT
Start: 2022-05-11 | End: 2022-06-01

## 2022-05-11 RX ORDER — AMLODIPINE BESYLATE 10 MG/1
10 TABLET ORAL DAILY
Qty: 30 TABLET | Refills: 0 | Status: SHIPPED | OUTPATIENT
Start: 2022-05-11 | End: 2023-05-11

## 2022-05-11 RX ORDER — CYCLOBENZAPRINE HCL 10 MG
10 TABLET ORAL 3 TIMES DAILY PRN
Qty: 15 TABLET | Refills: 0 | Status: SHIPPED | OUTPATIENT
Start: 2022-05-11 | End: 2022-05-16

## 2022-05-11 RX ORDER — IBUPROFEN 600 MG/1
600 TABLET ORAL EVERY 8 HOURS PRN
Qty: 15 TABLET | Refills: 0 | Status: SHIPPED | OUTPATIENT
Start: 2022-05-11

## 2022-05-11 RX ORDER — ACETAMINOPHEN 500 MG
1000 TABLET ORAL
Status: COMPLETED | OUTPATIENT
Start: 2022-05-11 | End: 2022-05-11

## 2022-05-11 RX ORDER — KETOROLAC TROMETHAMINE 30 MG/ML
15 INJECTION, SOLUTION INTRAMUSCULAR; INTRAVENOUS
Status: COMPLETED | OUTPATIENT
Start: 2022-05-11 | End: 2022-05-11

## 2022-05-11 RX ORDER — AMLODIPINE BESYLATE 5 MG/1
5 TABLET ORAL
Status: COMPLETED | OUTPATIENT
Start: 2022-05-11 | End: 2022-05-11

## 2022-05-11 RX ADMIN — IOHEXOL 100 ML: 300 INJECTION, SOLUTION INTRAVENOUS at 04:05

## 2022-05-11 RX ADMIN — AMLODIPINE BESYLATE 5 MG: 5 TABLET ORAL at 03:05

## 2022-05-11 RX ADMIN — KETOROLAC TROMETHAMINE 15 MG: 30 INJECTION, SOLUTION INTRAMUSCULAR at 11:05

## 2022-05-11 RX ADMIN — ACETAMINOPHEN 1000 MG: 500 TABLET ORAL at 11:05

## 2022-05-11 NOTE — Clinical Note
"Francis "Arabella Barrios was seen and treated in our emergency department on 5/11/2022.  He may return to work on 05/13/2022.       If you have any questions or concerns, please don't hesitate to call.      Neo Parikh MD"

## 2022-05-11 NOTE — ED PROVIDER NOTES
Encounter Date: 5/11/2022    SCRIBE #1 NOTE: I, Mayra Rosenberg, am scribing for, and in the presence of,  Neo Parikh MD. I have scribed the following portions of the note - Other sections scribed: HPI, ROS, PE.       History     Chief Complaint   Patient presents with    Neck Pain     Patient c/o left shoulder and neck pain x 2 days. Pt states he may have slept wrong and that is why his pain is present. Patient denies chest pain, sob, n/v./d, BP in triage is 221/114.     Mr. Tito Barrios is a 47 y.o male with a PMHx of HTN presenting to the ED with a chief complaint of neck pain onset 2 days ago. The patient reports waking up 2 days ago with left sided neck pain that radiates to his left shoulder and is exacerbated by moving his head in certain directions and looking up. States he thinks he may have slept on his neck wrong and denies any recent injury, fall, or trauma. Reports taking Tylenol yesterday at 12:00PM with minor relief. Additionally reports chronic upper back pain that is unchanged from baseline. Reports past compliancy with Amlodipine but states he has not taken it in 1 year. Denies chest pain and shortness of breath. No other associated symptoms.     The history is provided by the patient. No  was used.     Review of patient's allergies indicates:  No Known Allergies  Past Medical History:   Diagnosis Date    Hypertension     Kidney stone on left side      Past Surgical History:   Procedure Laterality Date    URETEROSCOPIC REMOVAL OF URETERIC CALCULUS Left 9/21/2018    Procedure: Cystoscopy, possible retrograde pyelogram, ureteroscopy with laser lithotripsy, stone basketing, placement of ureteral stent;  Surgeon: Mckenzie Lu MD;  Location: [facility] OR;  Service: Urology;  Laterality: Left;     Family History   Problem Relation Age of Onset    No Known Problems Mother     No Known Problems Father     No Known Problems Sister     No Known Problems Brother     No  Known Problems Maternal Aunt     No Known Problems Maternal Uncle     No Known Problems Paternal Aunt     No Known Problems Paternal Uncle     No Known Problems Maternal Grandmother     No Known Problems Maternal Grandfather     No Known Problems Paternal Grandmother     No Known Problems Paternal Grandfather     Amblyopia Neg Hx     Blindness Neg Hx     Cancer Neg Hx     Cataracts Neg Hx     Diabetes Neg Hx     Glaucoma Neg Hx     Hypertension Neg Hx     Macular degeneration Neg Hx     Retinal detachment Neg Hx     Strabismus Neg Hx     Stroke Neg Hx     Thyroid disease Neg Hx      Social History     Tobacco Use    Smoking status: Never Smoker    Smokeless tobacco: Never Used   Substance Use Topics    Alcohol use: No    Drug use: No     Review of Systems   Respiratory: Negative for shortness of breath.    Cardiovascular: Negative for chest pain.   Musculoskeletal: Positive for back pain (chronic) and neck pain.   All other systems reviewed and are negative.      Physical Exam     Initial Vitals [05/11/22 1024]   BP Pulse Resp Temp SpO2   (!) 221/114 83 16 98.5 °F (36.9 °C) 97 %      MAP       --         Physical Exam    Nursing note and vitals reviewed.  Constitutional: He appears well-developed and well-nourished. He is not diaphoretic. No distress.   HENT:   Head: Normocephalic and atraumatic.   Eyes: EOM are normal. Pupils are equal, round, and reactive to light.   Cardiovascular: Normal rate, regular rhythm and normal heart sounds.   Pulmonary/Chest: Breath sounds normal. No respiratory distress. He has no wheezes.   Abdominal: Abdomen is soft. He exhibits no distension. There is no abdominal tenderness.   Musculoskeletal:         General: No tenderness or edema. Normal range of motion.     Neurological: He is alert and oriented to person, place, and time.   Skin: Skin is warm and dry.   Psychiatric: He has a normal mood and affect. His behavior is normal. Thought content normal.          ED Course   Procedures  Labs Reviewed   COMPREHENSIVE METABOLIC PANEL - Abnormal; Notable for the following components:       Result Value    Glucose 190 (*)     AST 46 (*)     ALT 88 (*)     Anion Gap 7 (*)     All other components within normal limits   URINALYSIS, REFLEX TO URINE CULTURE - Abnormal; Notable for the following components:    Color, UA Colorless (*)     Glucose, UA 3+ (*)     All other components within normal limits    Narrative:     Specimen Source->Urine   CBC W/ AUTO DIFFERENTIAL   TROPONIN I   URINALYSIS MICROSCOPIC    Narrative:     Specimen Source->Urine     EKG Readings: (Independently Interpreted)   Initial Reading: No STEMI. Rhythm: Normal Sinus Rhythm. Ectopy: No Ectopy. Conduction: LPFB.     ECG Results          EKG 12-lead (Final result)  Result time 05/12/22 16:06:17    Final result by Interface, Lab In [facility] (05/12/22 16:06:17)                 Narrative:    Test Reason : M54.2,    Vent. Rate : 072 BPM     Atrial Rate : 072 BPM     P-R Int : 136 ms          QRS Dur : 090 ms      QT Int : 360 ms       P-R-T Axes : 019 115 -09 degrees     QTc Int : 394 ms    Normal sinus rhythm  Left posterior fascicular block  Inferior infarct ,age undetermined  Cannot rule out Anterior infarct (cited on or before 11-DEC-2019)  Abnormal ECG  When compared with ECG of 11-DEC-2019 15:22,  Significant changes have occurred  Confirmed by Deshaun Storey MD ([id number]) on 5/12/2022 4:06:04 PM    Referred By:             Confirmed By:Deshaun Storey MD                            Imaging Results          CTA Chest Abdomen Pelvis (Final result)  Result time 05/11/22 17:49:52    Final result by Valerie Seaman MD (05/11/22 17:49:52)                 Impression:      No thoracoabdominal aortic dissection or aneurysm.    Mild emphysematous changes.    Hepatic steatosis.    Bilateral nephrolithiasis.    Small hiatal hernia.      Electronically signed by: Valerie Seaman  Date:    05/11/2022  Time:    17:49     "         Narrative:    EXAMINATION:  CTA CHEST ABDOMEN PELVIS WITH    CLINICAL HISTORY:  Complaining of left shoulder neck pain x2 days.  Denies chest pain and shortness of breath.  Blood pressure in triage is 221/114.    TECHNIQUE:  1.25 mm unenhanced, enhanced, and 2 minute delayed enhanced axial images were obtained from the lung apices through the greater trochanters with attention to the aorta.  100 mL of Omnipaque 300 was injected.  Coronal and sagittal reformatted images were provided.    COMPARISON:  CT abdomen pelvis 09/13/2018    FINDINGS:  In the chest, there is no thoracic aortic dissection or aneurysm.  There is no contusion or pneumothorax. There are no displaced rib fractures. There is no pleural or pericardial effusion. The aorta is intact.  There are few emphysematous blebs.  The heart size is within normal limits. There is mild bibasilar atelectasis.    In the abdomen, there is no abdominal aortic dissection or aneurysm.  There is fatty infiltration of the liver.  There are bilateral nonobstructing renal calculi.  The spleen, pancreas, adrenal glands, and gallbladder are unremarkable.  There is no pneumoperitoneum or free fluid detected.  A small hiatal hernia is present.    In the pelvis, there is no iliac arterial dissection.  There is no free fluid.  The appendix is unremarkable.                               X-Ray Chest AP Portable (Final result)  Result time 05/11/22 12:36:25    Final result by Angel Hart MD (05/11/22 12:36:25)                 Impression:      No acute cardiopulmonary finding identified on this single view.      Electronically signed by: Angel Hart MD  Date:    05/11/2022  Time:    12:36             Narrative:    EXAMINATION:  XR CHEST AP PORTABLE    CLINICAL HISTORY:  Provided history is "  Essential (primary) hypertension".    TECHNIQUE:  One view of the chest.    COMPARISON:  Left ribs, 06/30/2018.    FINDINGS:  Cardiac wires overlie the chest.  " Cardiomediastinal silhouette is not significantly enlarged.  Lung volumes are relatively low.  No large focal consolidation.  No sizable pleural effusion.  No pneumothorax.                               X-Ray Cervical Spine AP And Lateral (Final result)  Result time 05/11/22 12:38:07    Final result by Angel Hart MD (05/11/22 12:38:07)                 Impression:      No acute bony abnormality in the cervical spine.      Electronically signed by: Angel Hart MD  Date:    05/11/2022  Time:    12:38             Narrative:    EXAMINATION:  XR CERVICAL SPINE AP LATERAL    CLINICAL HISTORY:  Cervicalgia    TECHNIQUE:  AP, lateral and open mouth views of the cervical spine were performed.    COMPARISON:  None.    FINDINGS:  C7 is partially obscured on the lateral view by the patient's shoulders.  Normal curvature and alignment.  Vertebral body heights are relatively well maintained.  No displaced fracture is identified.  No advanced degenerative changes.  Prevertebral soft tissues are unremarkable.                                 Medications   ketorolac injection 15 mg (15 mg Intravenous Given 5/11/22 1144)   acetaminophen tablet 1,000 mg (1,000 mg Oral Given 5/11/22 1145)   amLODIPine tablet 5 mg (5 mg Oral Given 5/11/22 1535)   iohexoL (OMNIPAQUE 300) injection 100 mL (100 mLs Intravenous Given 5/11/22 1655)     Medical Decision Making:   History:   Old Medical Records: I decided to obtain old medical records.  Clinical Tests:   Lab Tests: Ordered and Reviewed  Radiological Study: Ordered and Reviewed  Medical Tests: Ordered and Reviewed  ED Management:  Mr. Barrios reports 2-3 days of L lateral and L posterior neck pain that radiates through L trapezius to L shoulder and worsens with turning neck to L and looking up. Pain radiates more to shoulder with certain movements. Pain is radicular clinically.   Thinks he may have slept on it. He is tender to L lateral neck and can reproduce pain when looking left.    toradol given and pain decreased from 8/10 to 1/10.   He is noted to be hypertensive. Has not taken any bp meds in over a year, unsure what his baseline has been.   His bp are noted to be asymmetric in both arms, once almost 20 pts higher in L arm, twice higher in R arm.   Given back pain that has been chronic for some time and asymmetric and high blood pressures, CTA to rule out dissection was done out of an abundance of caution. He voiced understanding of the reason and plan. CTA was negative for dissection.   We discussed results and he will f/u w pcp. Will treat cervical radiculopathy and refer to ortho. Will place on norvasc and f/u w pcp. We discussed home care and return precautions. He is happy with the plan. There is no indication for further emergent intervention or evaluation at this time.             Scribe Attestation:   Scribe #1: I performed the above scribed service and the documentation accurately describes the services I performed. I attest to the accuracy of the note.                 Clinical Impression:   Final diagnoses:  [I10] Hypertension  [M54.2] Neck pain on left side  [M54.12] Cervical radiculopathy, acute - suspicion of (Primary)  [M62.838] Neck muscle spasm          ED Disposition Condition    Discharge Stable        ED Prescriptions     Medication Sig Dispense Start Date End Date Auth. Provider    ibuprofen (ADVIL,MOTRIN) 600 MG tablet Take 1 tablet (600 mg total) by mouth every 8 (eight) hours as needed for Pain. 15 tablet 5/11/2022  Neo Parikh MD    methylPREDNISolone (MEDROL DOSEPACK) 4 mg tablet Take dosepak as directed 1 each 5/11/2022 6/1/2022 Neo Parikh MD    cyclobenzaprine (FLEXERIL) 10 MG tablet Take 1 tablet (10 mg total) by mouth 3 (three) times daily as needed for Muscle spasms. 15 tablet 5/11/2022 5/16/2022 Neo Parikh MD    amLODIPine (NORVASC) 10 MG tablet Take 1 tablet (10 mg total) by mouth once daily. 30 tablet 5/11/2022 5/11/2023 Neo TOLBERT  MD Kati        Follow-up Information     Follow up With Specialties Details Why Contact Info    Alexis Roger MD Internal Medicine, Wound Care Schedule an appointment as soon as possible for a visit   [address] [facility] [address]  [phone]      [facility] Emergency Dept Emergency Medicine  As needed, If symptoms worsen [address]  [facility] [id number] [phone]           Neo Parikh MD  05/12/22 6016

## 2022-05-11 NOTE — ED TRIAGE NOTES
Pt arrived to ED with c/o neck pain x 2 days.  Pt states it started mildly and has become increasingly more painful, pain radiating to left shoulder.  Pt is also hypertensive, states he took HTN medication in the past, but hasn't been taking any for a bout a year since his prescription ran out.  Pt denies any CP, SOB, headache, or dizziness.

## 2022-05-12 NOTE — DISCHARGE INSTRUCTIONS
I'm glad you're feeling better. It was a pleasure taking care of you.   Rest. Drink plenty of fluids. Return for any new or acute problems or concerns.   Follow up with primary care for a recheck of your blood pressure and further routine care.   Follow up with primary care and orthopedist for further evaluation of your neck pain.

## 2023-01-15 NOTE — TELEPHONE ENCOUNTER
----- Message from Denisha Rodriguez sent at 9/24/2018  4:17 PM CDT -----  Contact: Saira with Prescription Jan - [phone]  Rep states pt is there waiting for prescription that Dr. Lu states she would call in.    2

## 2023-12-27 ENCOUNTER — HOSPITAL ENCOUNTER (EMERGENCY)
Facility: HOSPITAL | Age: 48
Discharge: HOME | End: 2023-12-27
Attending: EMERGENCY MEDICINE
Payer: COMMERCIAL

## 2023-12-27 VITALS
HEIGHT: 65 IN | TEMPERATURE: 98 F | HEART RATE: 80 BPM | SYSTOLIC BLOOD PRESSURE: 152 MMHG | DIASTOLIC BLOOD PRESSURE: 70 MMHG | BODY MASS INDEX: 27.49 KG/M2 | RESPIRATION RATE: 18 BRPM | OXYGEN SATURATION: 100 % | WEIGHT: 165 LBS

## 2023-12-27 DIAGNOSIS — N13.30 HYDRONEPHROSIS, UNSPECIFIED HYDRONEPHROSIS TYPE: ICD-10-CM

## 2023-12-27 DIAGNOSIS — N20.1 URETERAL STONE: Primary | ICD-10-CM

## 2023-12-27 LAB
ALBUMIN SERPL BCP-MCNC: 4.3 G/DL (ref 3.5–5.2)
ALP SERPL-CCNC: 93 U/L (ref 55–135)
ALT SERPL W/O P-5'-P-CCNC: 49 U/L (ref 10–44)
ANION GAP SERPL CALC-SCNC: 7 MMOL/L (ref 8–16)
AST SERPL-CCNC: 30 U/L (ref 10–40)
BACTERIA #/AREA URNS HPF: ABNORMAL /HPF
BASOPHILS # BLD AUTO: 0.03 K/UL (ref 0–0.2)
BASOPHILS NFR BLD: 0.4 % (ref 0–1.9)
BILIRUB SERPL-MCNC: 0.6 MG/DL (ref 0.1–1)
BILIRUB UR QL STRIP: NEGATIVE
BUN SERPL-MCNC: 17 MG/DL (ref 6–20)
CALCIUM SERPL-MCNC: 9.6 MG/DL (ref 8.7–10.5)
CHLORIDE SERPL-SCNC: 105 MMOL/L (ref 95–110)
CLARITY UR: CLEAR
CO2 SERPL-SCNC: 26 MMOL/L (ref 23–29)
COLOR UR: YELLOW
CREAT SERPL-MCNC: 1.3 MG/DL (ref 0.5–1.4)
DIFFERENTIAL METHOD BLD: NORMAL
EOSINOPHIL # BLD AUTO: 0.2 K/UL (ref 0–0.5)
EOSINOPHIL NFR BLD: 2.4 % (ref 0–8)
ERYTHROCYTE [DISTWIDTH] IN BLOOD BY AUTOMATED COUNT: 11.6 % (ref 11.5–14.5)
EST. GFR  (NO RACE VARIABLE): >60 ML/MIN/1.73 M^2
GLUCOSE SERPL-MCNC: 208 MG/DL (ref 70–110)
GLUCOSE UR QL STRIP: ABNORMAL
HCT VFR BLD AUTO: 47.3 % (ref 40–54)
HGB BLD-MCNC: 15.6 G/DL (ref 14–18)
HGB UR QL STRIP: ABNORMAL
IMM GRANULOCYTES # BLD AUTO: 0.01 K/UL (ref 0–0.04)
IMM GRANULOCYTES NFR BLD AUTO: 0.1 % (ref 0–0.5)
KETONES UR QL STRIP: NEGATIVE
LEUKOCYTE ESTERASE UR QL STRIP: NEGATIVE
LYMPHOCYTES # BLD AUTO: 1.9 K/UL (ref 1–4.8)
LYMPHOCYTES NFR BLD: 23.8 % (ref 18–48)
MCH RBC QN AUTO: 29.2 PG (ref 27–31)
MCHC RBC AUTO-ENTMCNC: 33 G/DL (ref 32–36)
MCV RBC AUTO: 89 FL (ref 82–98)
MICROSCOPIC COMMENT: ABNORMAL
MONOCYTES # BLD AUTO: 0.7 K/UL (ref 0.3–1)
MONOCYTES NFR BLD: 8.1 % (ref 4–15)
NEUTROPHILS # BLD AUTO: 5.3 K/UL (ref 1.8–7.7)
NEUTROPHILS NFR BLD: 65.2 % (ref 38–73)
NITRITE UR QL STRIP: NEGATIVE
NRBC BLD-RTO: 0 /100 WBC
PH UR STRIP: 6 [PH] (ref 5–8)
PLATELET # BLD AUTO: 212 K/UL (ref 150–450)
PMV BLD AUTO: 10.4 FL (ref 9.2–12.9)
POTASSIUM SERPL-SCNC: 3.7 MMOL/L (ref 3.5–5.1)
PROT SERPL-MCNC: 7.8 G/DL (ref 6–8.4)
PROT UR QL STRIP: NEGATIVE
RBC # BLD AUTO: 5.34 M/UL (ref 4.6–6.2)
RBC #/AREA URNS HPF: 12 /HPF (ref 0–4)
SODIUM SERPL-SCNC: 138 MMOL/L (ref 136–145)
SP GR UR STRIP: 1.01 (ref 1–1.03)
URN SPEC COLLECT METH UR: ABNORMAL
UROBILINOGEN UR STRIP-ACNC: NEGATIVE EU/DL
WBC # BLD AUTO: 8.04 K/UL (ref 3.9–12.7)
WBC #/AREA URNS HPF: 0 /HPF (ref 0–5)
YEAST URNS QL MICRO: ABNORMAL

## 2023-12-27 PROCEDURE — 85025 COMPLETE CBC W/AUTO DIFF WBC: CPT | Performed by: NURSE PRACTITIONER

## 2023-12-27 PROCEDURE — 25500020 PHARM REV CODE 255: Performed by: NURSE PRACTITIONER

## 2023-12-27 PROCEDURE — 81000 URINALYSIS NONAUTO W/SCOPE: CPT | Performed by: NURSE PRACTITIONER

## 2023-12-27 PROCEDURE — 99285 EMERGENCY DEPT VISIT HI MDM: CPT | Mod: 25

## 2023-12-27 PROCEDURE — 96361 HYDRATE IV INFUSION ADD-ON: CPT

## 2023-12-27 PROCEDURE — 25000003 PHARM REV CODE 250: Performed by: NURSE PRACTITIONER

## 2023-12-27 PROCEDURE — 63600175 PHARM REV CODE 636 W HCPCS: Performed by: NURSE PRACTITIONER

## 2023-12-27 PROCEDURE — 96374 THER/PROPH/DIAG INJ IV PUSH: CPT

## 2023-12-27 PROCEDURE — 80053 COMPREHEN METABOLIC PANEL: CPT | Performed by: NURSE PRACTITIONER

## 2023-12-27 RX ORDER — TRAMADOL HYDROCHLORIDE 50 MG/1
50 TABLET ORAL EVERY 6 HOURS PRN
Qty: 12 TABLET | Refills: 0 | Status: SHIPPED | OUTPATIENT
Start: 2023-12-27 | End: 2023-12-30

## 2023-12-27 RX ORDER — KETOROLAC TROMETHAMINE 30 MG/ML
15 INJECTION, SOLUTION INTRAMUSCULAR; INTRAVENOUS
Status: COMPLETED | OUTPATIENT
Start: 2023-12-27 | End: 2023-12-27

## 2023-12-27 RX ORDER — TAMSULOSIN HYDROCHLORIDE 0.4 MG/1
0.4 CAPSULE ORAL DAILY
Qty: 10 CAPSULE | Refills: 0 | Status: SHIPPED | OUTPATIENT
Start: 2023-12-27 | End: 2024-12-26

## 2023-12-27 RX ORDER — ONDANSETRON 4 MG/1
4 TABLET, ORALLY DISINTEGRATING ORAL EVERY 6 HOURS PRN
Qty: 20 TABLET | Refills: 0 | Status: SHIPPED | OUTPATIENT
Start: 2023-12-27

## 2023-12-27 RX ADMIN — IOHEXOL 75 ML: 350 INJECTION, SOLUTION INTRAVENOUS at 11:12

## 2023-12-27 RX ADMIN — KETOROLAC TROMETHAMINE 15 MG: 30 INJECTION, SOLUTION INTRAMUSCULAR; INTRAVENOUS at 11:12

## 2023-12-27 RX ADMIN — SODIUM CHLORIDE 1000 ML: 9 INJECTION, SOLUTION INTRAVENOUS at 11:12

## 2023-12-27 NOTE — DISCHARGE INSTRUCTIONS
Patient called asking for refill    Lisinopril    Her dosage has changed and she is asking for new prescription written as 10 mg    [phone]    [facility]   Please strain your urine and if you happen to pass the stone place it in the specimen cup and bring to your urologist.  Follow-up with urologist by the end of the week.  Please return with uncontrolled pain, fever or any worsening of symptoms.

## 2023-12-27 NOTE — ED PROVIDER NOTES
Encounter Date: 12/27/2023       History     Chief Complaint   Patient presents with    Flank Pain     Pt c/o flank pain and lower abdominal pain since this morning. Pt denied painful urination or burning.     48-year-old male with a history of hypertension and kidney stones which presents to the emergency with bilateral flank pain that radiates to the front of his abdomen that began upon waking this morning.  Patient denies any nausea or vomiting. No other symptoms at this time.    The history is provided by the patient.     Review of patient's allergies indicates:  No Known Allergies  Past Medical History:   Diagnosis Date    Hypertension     Kidney stone on left side      Past Surgical History:   Procedure Laterality Date    URETEROSCOPIC REMOVAL OF URETERIC CALCULUS Left 9/21/2018    Procedure: Cystoscopy, possible retrograde pyelogram, ureteroscopy with laser lithotripsy, stone basketing, placement of ureteral stent;  Surgeon: Mckenzie Lu MD;  Location: [facility];  Service: Urology;  Laterality: Left;     Family History   Problem Relation Age of Onset    No Known Problems Mother     No Known Problems Father     No Known Problems Sister     No Known Problems Brother     No Known Problems Maternal Aunt     No Known Problems Maternal Uncle     No Known Problems Paternal Aunt     No Known Problems Paternal Uncle     No Known Problems Maternal Grandmother     No Known Problems Maternal Grandfather     No Known Problems Paternal Grandmother     No Known Problems Paternal Grandfather     Amblyopia Neg Hx     Blindness Neg Hx     Cancer Neg Hx     Cataracts Neg Hx     Diabetes Neg Hx     Glaucoma Neg Hx     Hypertension Neg Hx     Macular degeneration Neg Hx     Retinal detachment Neg Hx     Strabismus Neg Hx     Stroke Neg Hx     Thyroid disease Neg Hx      Social History     Tobacco Use    Smoking status: Never    Smokeless tobacco: Never   Substance Use Topics    Alcohol use: No    Drug use: No     Review of  Systems   Constitutional:  Negative for fever.   HENT:  Negative for sore throat.    Respiratory:  Negative for shortness of breath.    Cardiovascular:  Negative for chest pain.   Gastrointestinal:  Positive for abdominal pain. Negative for nausea.   Endocrine: Negative for polydipsia.   Genitourinary:  Positive for flank pain. Negative for dysuria, frequency and testicular pain.   Musculoskeletal:  Negative for back pain.   Skin:  Negative for rash.   Neurological:  Negative for weakness.   Hematological:  Does not bruise/bleed easily.   All other systems reviewed and are negative.    Physical Exam     Initial Vitals [12/27/23 1037]   BP Pulse Resp Temp SpO2   (!) 176/79 75 18 98.4 °F (36.9 °C) 98 %      MAP       --         Physical Exam    Nursing note and vitals reviewed.  Constitutional: He appears well-developed and well-nourished. He is cooperative.  Non-toxic appearance.   HENT:   Head: Normocephalic and atraumatic.   Mouth/Throat: Uvula is midline and mucous membranes are normal.   Eyes: Conjunctivae are normal. Pupils are equal, round, and reactive to light.   Neck:   Normal range of motion.  Cardiovascular:  Normal rate, regular rhythm, S1 normal, S2 normal, normal heart sounds, intact distal pulses and normal pulses.     Exam reveals no gallop and no friction rub.       No murmur heard.  Pulmonary/Chest: Breath sounds normal. No stridor. No respiratory distress. He has no wheezes. He has no rhonchi. He has no rales. He exhibits no tenderness.   Abdominal: Abdomen is soft. Bowel sounds are normal. He exhibits no distension and no mass.   Bilateral CVA tenderness There is no rebound and no guarding.   Musculoskeletal:         General: No tenderness or edema. Normal range of motion.      Cervical back: Normal range of motion.     Lymphadenopathy:     He has no cervical adenopathy.   Neurological: He is alert and oriented to person, place, and time. He has normal strength. GCS score is 15. GCS eye subscore  is 4. GCS verbal subscore is 5. GCS motor subscore is 6.   Skin: Skin is warm and intact. Capillary refill takes less than 2 seconds. No rash noted.   Psychiatric: He has a normal mood and affect.       ED Course   Procedures  Labs Reviewed   COMPREHENSIVE METABOLIC PANEL - Abnormal; Notable for the following components:       Result Value    Glucose 208 (*)     ALT 49 (*)     Anion Gap 7 (*)     All other components within normal limits   URINALYSIS, REFLEX TO URINE CULTURE - Abnormal; Notable for the following components:    Glucose, UA 3+ (*)     Occult Blood UA Trace (*)     All other components within normal limits    Narrative:     Specimen Source->Urine   URINALYSIS MICROSCOPIC - Abnormal; Notable for the following components:    RBC, UA 12 (*)     All other components within normal limits    Narrative:     Specimen Source->Urine   CBC W/ AUTO DIFFERENTIAL          Imaging Results               CT Renal Stone Study ABD Pelvis WO (Final result)  Result time 12/27/23 12:14:28      Final result by Nick Jones MD (12/27/23 12:14:28)                   Impression:      1. 0.5 x 0.5 x 0.8-cm (HU = 1287) urolith in the mid/distal right ureter associated with mild proximal hydroureteronephrosis.  2. Diffuse hepatic steatosis, not significantly changed.  This report was flagged in Epic as abnormal.      Electronically signed by: Nick Jones  Date:    12/27/2023  Time:    12:14               Narrative:    EXAMINATION:  CT RENAL STONE STUDY ABD PELVIS WO    CLINICAL HISTORY:  Flank pain, kidney stone suspected;    TECHNIQUE:  Low dose axial images, sagittal and coronal reformations were obtained from the lung bases to the pubic symphysis. IV and oral contrast were not utilized, limiting evaluation of soft tissue structures, vascular structures and hollow viscus organs    COMPARISON:  CT chest/abdomen/pelvis 05/11/2022    FINDINGS:  Heart: Heart is not enlarged. No significant pericardial thickening in the field of  view.    Lung Bases: Imaged portions of the lung bases are clear.    Hepatobiliary: Diffusely decreased hepatic parenchymal attenuation, not significantly changed.  Otherwise, close the liver is normal in size and morphology without appreciable surface nodularity. No sizable suspicious mass on this limited non-enhanced exam.  Gallbladder is grossly unremarkable without radiodense stones, mural thickening, pericholecystic fluid or pericholecystic fat stranding.  No biliary dilatation.    Pancreas: Pancreas is normal in size and morphology without appreciable surrounding fat stranding, sizable fluid or sizable suspicious mass on this partially limited non-enhanced exam.    Spleen: Spleen is normal in size and morphology without appreciable sizable parenchymal mass on this non-enhanced exam.    Adrenals: Adrenal glands are symmetric and normal in size and morphology without appreciable nodularity.    Kidneys: Kidneys are normal in location, size, attenuation and morphology. 0.5 x 0.5 x 0.8-cm (HU = 1287) urolith in the mid/distal right ureter associated with mild proximal hydroureteronephrosis.  No additional appreciable right-sided uroliths, obstructing left-sided uroliths or left-sided hydroureteronephrosis.  No appreciable sizable suspicious mass on this limited non-enhanced exam.    Bladder: Urinary bladder is well distended and is grossly unremarkable without evidence of mural thickening or nodularity.    GI Tract: The unenhanced and unopacified stomach, small bowel and colon are normal in course and caliber without evidence of bowel obstruction. No significant fat stranding. No free air. No free fluid. Appendix is normal.    Mesentery: Grossly unremarkable without free fluid or sizable suspicious mass on this non-enhanced exam.    Retroperitoneum: Aorta is normal in course and caliber without evidence of aneurysmal degeneration.  No sizable suspicious retroperitoneal mass, fluid collection or  lymphadenopathy.    Pelvis: Reproductive organs are grossly unremarkable.  No appreciable sizable pelvic mass on this partially limited nonenhanced exam.  No appreciable free fluid or organized/drainable fluid collection.  No pelvic lymphadenopathy.    Soft tissues: Imaged/visualized soft tissues are grossly unremarkable.    Osseous structures: Mild multilevel degenerative changes of the imaged spine.  No acute displaced fracture, dislocation or suspicious lytic/blastic osseous lesions.                                       Medications   sodium chloride 0.9% bolus 1,000 mL 1,000 mL (0 mLs Intravenous Stopped 12/27/23 1220)   ketorolac injection 15 mg (15 mg Intravenous Given 12/27/23 1156)   iohexoL (OMNIPAQUE 350) injection 75 mL (75 mLs Intravenous Given 12/27/23 1152)     Medical Decision Making  48-year-old male which presents to the emergency room with bilateral flank pain.  RBCs on urinalysis and CT shows a right-sided ureteral stone with moderate hydronephrosis.  He was given fluids and Toradol while in the ED which alleviated his pain.  Patient has a urologist and will follow up with the urologist this week.  Patient discharged with Flomax, tramadol and Zofran.  He was given a strainer at discharge. Patient given strict return precautions and voiced understanding of all discharge instructions. Pt was stable at discharge.       Differential diagnosis:  Ureteral stone, pyelonephritis, UTI    Problems Addressed:  Hydronephrosis, unspecified hydronephrosis type: acute illness or injury  Ureteral stone: acute illness or injury    Amount and/or Complexity of Data Reviewed  Labs: ordered. Decision-making details documented in ED Course.  Radiology: ordered and independent interpretation performed. Decision-making details documented in ED Course.    Risk  Prescription drug management.               ED Course as of 12/27/23 1427   Wed Dec 27, 2023   1106 BP(!): 176/79 [AT]   1106 Temp: 98.4 °F (36.9 °C) [AT]   1106  Temp Source: Oral [AT]   1106 Pulse: 75 [AT]   1106 Resp: 18 [AT]   1106 SpO2: 98 % [AT]   1158 Glucose(!): 208 [AT]   1203 CT Renal Stone Study ABD Pelvis WO  Independent interpretation: right sided ureteral stone  [AT]   1218 RBC, UA(!): 12 [AT]   1227 Pt feeling better [AT]      ED Course User Index  [AT] Mayra Blackmon FNP                           Clinical Impression:  Final diagnoses:  [N20.1] Ureteral stone (Primary)  [N13.30] Hydronephrosis, unspecified hydronephrosis type          ED Disposition Condition    Discharge Stable          ED Prescriptions       Medication Sig Dispense Start Date End Date Auth. Provider    tamsulosin (FLOMAX) 0.4 mg Cap Take 1 capsule (0.4 mg total) by mouth once daily. 10 capsule 12/27/2023 12/26/2024 Mayra Blackmon FNP    traMADoL (ULTRAM) 50 mg tablet Take 1 tablet (50 mg total) by mouth every 6 (six) hours as needed for Pain. 12 tablet 12/27/2023 12/30/2023 Mayra Blackmon FNP    ondansetron (ZOFRAN-ODT) 4 MG TbDL Take 1 tablet (4 mg total) by mouth every 6 (six) hours as needed (nausea). 20 tablet 12/27/2023 -- Mayra Blackmon FNP          Follow-up Information       Follow up With Specialties Details Why Contact Info    Mckenzie Lu MD Urology Schedule an appointment as soon as possible for a visit in 2 days  [address]  [address]  [facility] [address]  [phone]               Mayra Blackmon FNP  12/27/23 2756     26-Apr-2022 10:55

## 2024-02-06 ENCOUNTER — HOSPITAL ENCOUNTER (EMERGENCY)
Facility: HOSPITAL | Age: 49
Discharge: HOME | End: 2024-02-06
Attending: STUDENT IN AN ORGANIZED HEALTH CARE EDUCATION/TRAINING PROGRAM
Payer: COMMERCIAL

## 2024-02-06 ENCOUNTER — TELEPHONE (OUTPATIENT)
Dept: FAMILY MEDICINE | Facility: CLINIC | Age: 49
End: 2024-02-06
Payer: COMMERCIAL

## 2024-02-06 VITALS
HEIGHT: 65 IN | TEMPERATURE: 99 F | BODY MASS INDEX: 29.16 KG/M2 | DIASTOLIC BLOOD PRESSURE: 106 MMHG | OXYGEN SATURATION: 97 % | RESPIRATION RATE: 18 BRPM | HEART RATE: 89 BPM | SYSTOLIC BLOOD PRESSURE: 162 MMHG | WEIGHT: 175 LBS

## 2024-02-06 DIAGNOSIS — N20.1 URETEROLITHIASIS: ICD-10-CM

## 2024-02-06 DIAGNOSIS — K21.9 GASTROESOPHAGEAL REFLUX DISEASE, UNSPECIFIED WHETHER ESOPHAGITIS PRESENT: Primary | ICD-10-CM

## 2024-02-06 LAB
ALBUMIN SERPL BCP-MCNC: 4.1 G/DL (ref 3.5–5.2)
ALLENS TEST: ABNORMAL
ALP SERPL-CCNC: 98 U/L (ref 55–135)
ALT SERPL W/O P-5'-P-CCNC: 53 U/L (ref 10–44)
ANION GAP SERPL CALC-SCNC: 11 MMOL/L (ref 8–16)
ANION GAP SERPL CALC-SCNC: 16 MMOL/L (ref 8–16)
AST SERPL-CCNC: 30 U/L (ref 10–40)
BASOPHILS # BLD AUTO: 0.03 K/UL (ref 0–0.2)
BASOPHILS NFR BLD: 0.2 % (ref 0–1.9)
BILIRUB SERPL-MCNC: 0.7 MG/DL (ref 0.1–1)
BILIRUB UR QL STRIP: NEGATIVE
BUN SERPL-MCNC: 17 MG/DL (ref 6–20)
BUN SERPL-MCNC: 24 MG/DL (ref 6–30)
CALCIUM SERPL-MCNC: 9.2 MG/DL (ref 8.7–10.5)
CHLORIDE SERPL-SCNC: 103 MMOL/L (ref 95–110)
CHLORIDE SERPL-SCNC: 99 MMOL/L (ref 95–110)
CLARITY UR: CLEAR
CO2 SERPL-SCNC: 23 MMOL/L (ref 23–29)
COLOR UR: YELLOW
CREAT SERPL-MCNC: 1.7 MG/DL (ref 0.5–1.4)
CREAT SERPL-MCNC: 1.8 MG/DL (ref 0.5–1.4)
DIFFERENTIAL METHOD BLD: ABNORMAL
EOSINOPHIL # BLD AUTO: 0.1 K/UL (ref 0–0.5)
EOSINOPHIL NFR BLD: 0.8 % (ref 0–8)
ERYTHROCYTE [DISTWIDTH] IN BLOOD BY AUTOMATED COUNT: 11.5 % (ref 11.5–14.5)
EST. GFR  (NO RACE VARIABLE): 46 ML/MIN/1.73 M^2
GLUCOSE SERPL-MCNC: 193 MG/DL (ref 70–110)
GLUCOSE SERPL-MCNC: 198 MG/DL (ref 70–110)
GLUCOSE UR QL STRIP: ABNORMAL
HCT VFR BLD AUTO: 47.9 % (ref 40–54)
HCT VFR BLD CALC: 53 %PCV (ref 36–54)
HGB BLD-MCNC: 16.2 G/DL (ref 14–18)
HGB UR QL STRIP: ABNORMAL
HYALINE CASTS #/AREA URNS LPF: 1 /LPF
IMM GRANULOCYTES # BLD AUTO: 0.04 K/UL (ref 0–0.04)
IMM GRANULOCYTES NFR BLD AUTO: 0.3 % (ref 0–0.5)
KETONES UR QL STRIP: NEGATIVE
LACTATE SERPL-SCNC: 1.9 MMOL/L (ref 0.5–2.2)
LEUKOCYTE ESTERASE UR QL STRIP: NEGATIVE
LIPASE SERPL-CCNC: 33 U/L (ref 4–60)
LYMPHOCYTES # BLD AUTO: 1.7 K/UL (ref 1–4.8)
LYMPHOCYTES NFR BLD: 13.4 % (ref 18–48)
MCH RBC QN AUTO: 29.3 PG (ref 27–31)
MCHC RBC AUTO-ENTMCNC: 33.8 G/DL (ref 32–36)
MCV RBC AUTO: 87 FL (ref 82–98)
MICROSCOPIC COMMENT: NORMAL
MONOCYTES # BLD AUTO: 1.1 K/UL (ref 0.3–1)
MONOCYTES NFR BLD: 8.7 % (ref 4–15)
NEUTROPHILS # BLD AUTO: 9.6 K/UL (ref 1.8–7.7)
NEUTROPHILS NFR BLD: 76.6 % (ref 38–73)
NITRITE UR QL STRIP: NEGATIVE
NRBC BLD-RTO: 0 /100 WBC
PH UR STRIP: 6 [PH] (ref 5–8)
PLATELET # BLD AUTO: 197 K/UL (ref 150–450)
PMV BLD AUTO: 10.1 FL (ref 9.2–12.9)
POC IONIZED CALCIUM: 1.27 MMOL/L (ref 1.06–1.42)
POC TCO2 (MEASURED): 28 MMOL/L (ref 23–29)
POTASSIUM BLD-SCNC: 4 MMOL/L (ref 3.5–5.1)
POTASSIUM SERPL-SCNC: 4.1 MMOL/L (ref 3.5–5.1)
PROT SERPL-MCNC: 8.1 G/DL (ref 6–8.4)
PROT UR QL STRIP: NEGATIVE
RBC # BLD AUTO: 5.53 M/UL (ref 4.6–6.2)
RBC #/AREA URNS HPF: 2 /HPF (ref 0–4)
SAMPLE: ABNORMAL
SITE: ABNORMAL
SODIUM BLD-SCNC: 138 MMOL/L (ref 136–145)
SODIUM SERPL-SCNC: 137 MMOL/L (ref 136–145)
SP GR UR STRIP: 1.01 (ref 1–1.03)
URN SPEC COLLECT METH UR: ABNORMAL
UROBILINOGEN UR STRIP-ACNC: NEGATIVE EU/DL
WBC # BLD AUTO: 12.5 K/UL (ref 3.9–12.7)

## 2024-02-06 PROCEDURE — 83690 ASSAY OF LIPASE: CPT | Performed by: STUDENT IN AN ORGANIZED HEALTH CARE EDUCATION/TRAINING PROGRAM

## 2024-02-06 PROCEDURE — 96375 TX/PRO/DX INJ NEW DRUG ADDON: CPT

## 2024-02-06 PROCEDURE — 80053 COMPREHEN METABOLIC PANEL: CPT | Performed by: STUDENT IN AN ORGANIZED HEALTH CARE EDUCATION/TRAINING PROGRAM

## 2024-02-06 PROCEDURE — 81000 URINALYSIS NONAUTO W/SCOPE: CPT | Performed by: STUDENT IN AN ORGANIZED HEALTH CARE EDUCATION/TRAINING PROGRAM

## 2024-02-06 PROCEDURE — 99284 EMERGENCY DEPT VISIT MOD MDM: CPT | Mod: 25

## 2024-02-06 PROCEDURE — 25000003 PHARM REV CODE 250: Performed by: STUDENT IN AN ORGANIZED HEALTH CARE EDUCATION/TRAINING PROGRAM

## 2024-02-06 PROCEDURE — 82565 ASSAY OF CREATININE: CPT

## 2024-02-06 PROCEDURE — 96361 HYDRATE IV INFUSION ADD-ON: CPT

## 2024-02-06 PROCEDURE — 99900035 HC TECH TIME PER 15 MIN (STAT)

## 2024-02-06 PROCEDURE — 96374 THER/PROPH/DIAG INJ IV PUSH: CPT

## 2024-02-06 PROCEDURE — 85014 HEMATOCRIT: CPT

## 2024-02-06 PROCEDURE — 84295 ASSAY OF SERUM SODIUM: CPT | Mod: 91

## 2024-02-06 PROCEDURE — 82330 ASSAY OF CALCIUM: CPT

## 2024-02-06 PROCEDURE — 83605 ASSAY OF LACTIC ACID: CPT | Performed by: STUDENT IN AN ORGANIZED HEALTH CARE EDUCATION/TRAINING PROGRAM

## 2024-02-06 PROCEDURE — 63600175 PHARM REV CODE 636 W HCPCS: Mod: JZ,JG | Performed by: STUDENT IN AN ORGANIZED HEALTH CARE EDUCATION/TRAINING PROGRAM

## 2024-02-06 PROCEDURE — 84132 ASSAY OF SERUM POTASSIUM: CPT

## 2024-02-06 PROCEDURE — 85025 COMPLETE CBC W/AUTO DIFF WBC: CPT | Performed by: STUDENT IN AN ORGANIZED HEALTH CARE EDUCATION/TRAINING PROGRAM

## 2024-02-06 RX ORDER — MORPHINE SULFATE 4 MG/ML
4 INJECTION, SOLUTION INTRAMUSCULAR; INTRAVENOUS
Status: COMPLETED | OUTPATIENT
Start: 2024-02-06 | End: 2024-02-06

## 2024-02-06 RX ORDER — ONDANSETRON 4 MG/1
4 TABLET, ORALLY DISINTEGRATING ORAL EVERY 8 HOURS PRN
Qty: 20 TABLET | Refills: 0 | Status: SHIPPED | OUTPATIENT
Start: 2024-02-06

## 2024-02-06 RX ORDER — ALUMINUM HYDROXIDE, MAGNESIUM HYDROXIDE, AND SIMETHICONE 1200; 120; 1200 MG/30ML; MG/30ML; MG/30ML
30 SUSPENSION ORAL ONCE
Status: COMPLETED | OUTPATIENT
Start: 2024-02-06 | End: 2024-02-06

## 2024-02-06 RX ORDER — HYOSCYAMINE SULFATE 0.12 MG/1
0.12 TABLET SUBLINGUAL EVERY 4 HOURS PRN
Qty: 28 TABLET | Refills: 0 | Status: SHIPPED | OUTPATIENT
Start: 2024-02-06 | End: 2024-02-13

## 2024-02-06 RX ORDER — LIDOCAINE HYDROCHLORIDE 20 MG/ML
15 SOLUTION OROPHARYNGEAL ONCE
Status: COMPLETED | OUTPATIENT
Start: 2024-02-06 | End: 2024-02-06

## 2024-02-06 RX ORDER — PANTOPRAZOLE SODIUM 20 MG/1
20 TABLET, DELAYED RELEASE ORAL DAILY
Qty: 30 TABLET | Refills: 0 | Status: SHIPPED | OUTPATIENT
Start: 2024-02-06 | End: 2024-03-07

## 2024-02-06 RX ORDER — HYDROCODONE BITARTRATE AND ACETAMINOPHEN 7.5; 325 MG/1; MG/1
1 TABLET ORAL EVERY 6 HOURS PRN
Qty: 12 TABLET | Refills: 0 | Status: SHIPPED | OUTPATIENT
Start: 2024-02-06

## 2024-02-06 RX ORDER — ONDANSETRON HYDROCHLORIDE 2 MG/ML
4 INJECTION, SOLUTION INTRAVENOUS
Status: COMPLETED | OUTPATIENT
Start: 2024-02-06 | End: 2024-02-06

## 2024-02-06 RX ADMIN — ONDANSETRON 4 MG: 2 INJECTION INTRAMUSCULAR; INTRAVENOUS at 04:02

## 2024-02-06 RX ADMIN — SODIUM CHLORIDE 1000 ML: 9 INJECTION, SOLUTION INTRAVENOUS at 04:02

## 2024-02-06 RX ADMIN — ALUMINUM HYDROXIDE, MAGNESIUM HYDROXIDE, AND DIMETHICONE 30 ML: 200; 20; 200 SUSPENSION ORAL at 04:02

## 2024-02-06 RX ADMIN — LIDOCAINE HYDROCHLORIDE 15 ML: 20 SOLUTION ORAL; TOPICAL at 04:02

## 2024-02-06 RX ADMIN — MORPHINE SULFATE 4 MG: 4 INJECTION, SOLUTION INTRAMUSCULAR; INTRAVENOUS at 04:02

## 2024-02-06 NOTE — TELEPHONE ENCOUNTER
----- Message from Alexis Roger MD sent at 2/6/2024  7:31 AM CST -----  Regarding: ed follow up  Patient seen in ED this morning also booked  for ov this afternoon. Need to reschedule this afternoon's appointment as can not have lower level of care visit same day. Please reschedule to one to two weeks with either myself or FELIX brumfield. Thank you

## 2024-02-06 NOTE — ED PROVIDER NOTES
Encounter Date: 2/6/2024    SCRIBE #1 NOTE: I, Jose Daniel Mir, am scribing for, and in the presence of,  Nelson Walters MD.       History     Chief Complaint   Patient presents with    Abdominal Pain    Back Pain     Pt presents to ER with complaints of abd and back pain since 1900 on yesterday. Pt rates pain 9/10 at this time and says he took Tramadol. Pt says he is light headed but denies any other issues at this time.      Tito Barrios is a 48 y.o. male with a PMHx of HTN, nephrolithiasis, who presents to the ED for evaluation of abdominal pain beginning around 6 PM yesterday. Patient endorses he ate around 6 PM, after which he began experiencing upper abdominal pain. He notes he usually has abdominal pain but his pain today is worse than usual. He also reports some back pain, as well as nausea. Additionally, he notes small amount of blood in his stool a week ago, noting it then subsided for a day, and he then had a dark brown bowel movement, but has not had any issues since. Reports attempting treatment with tramadol with no immediate relief noted. No other medications taken PTA. No alleviating or exacerbating factors noted. Denies fever, chills, vomiting, or other associated symptoms. Denies EtOH use. NKDA.     The history is provided by the patient. No  was used.     Review of patient's allergies indicates:  No Known Allergies  Past Medical History:   Diagnosis Date    Hypertension     Kidney stone on left side      Past Surgical History:   Procedure Laterality Date    URETEROSCOPIC REMOVAL OF URETERIC CALCULUS Left 9/21/2018    Procedure: Cystoscopy, possible retrograde pyelogram, ureteroscopy with laser lithotripsy, stone basketing, placement of ureteral stent;  Surgeon: Mckenzie Lu MD;  Location: [facility];  Service: Urology;  Laterality: Left;     Family History   Problem Relation Age of Onset    No Known Problems Mother     No Known Problems Father     No Known Problems  Sister     No Known Problems Brother     No Known Problems Maternal Aunt     No Known Problems Maternal Uncle     No Known Problems Paternal Aunt     No Known Problems Paternal Uncle     No Known Problems Maternal Grandmother     No Known Problems Maternal Grandfather     No Known Problems Paternal Grandmother     No Known Problems Paternal Grandfather     Amblyopia Neg Hx     Blindness Neg Hx     Cancer Neg Hx     Cataracts Neg Hx     Diabetes Neg Hx     Glaucoma Neg Hx     Hypertension Neg Hx     Macular degeneration Neg Hx     Retinal detachment Neg Hx     Strabismus Neg Hx     Stroke Neg Hx     Thyroid disease Neg Hx      Social History     Tobacco Use    Smoking status: Never    Smokeless tobacco: Never   Substance Use Topics    Alcohol use: No    Drug use: No     Review of Systems   Constitutional:  Negative for chills and fever.   HENT:  Negative for facial swelling and sore throat.    Eyes:  Negative for visual disturbance.   Respiratory:  Negative for cough and shortness of breath.    Cardiovascular:  Negative for chest pain and palpitations.   Gastrointestinal:  Positive for abdominal pain and nausea. Negative for constipation, diarrhea and vomiting.   Genitourinary:  Negative for dysuria and hematuria.   Musculoskeletal:  Positive for back pain.   Skin:  Negative for rash.   Neurological:  Negative for weakness and headaches.   Hematological:  Does not bruise/bleed easily.   Psychiatric/Behavioral: Negative.         Physical Exam     Initial Vitals [02/06/24 0255]   BP Pulse Resp Temp SpO2   (!) 175/125 (!) 111 20 98.6 °F (37 °C) 100 %      MAP       --         Physical Exam    Nursing note and vitals reviewed.  Constitutional: He appears well-developed and well-nourished. He is not diaphoretic. No distress.   HENT:   Head: Normocephalic and atraumatic.   Right Ear: External ear normal.   Left Ear: External ear normal.   Nose: Nose normal.   Eyes: Conjunctivae are normal. No scleral icterus.   Neck: Neck  supple. No tracheal deviation present.   Cardiovascular:  Regular rhythm and normal heart sounds.   Tachycardia present.   Exam reveals no gallop and no friction rub.       No murmur heard.  Pulmonary/Chest: Breath sounds normal. No respiratory distress.   Abdominal: Abdomen is soft. Bowel sounds are normal. There is abdominal tenderness in the epigastric area and left upper quadrant.   There is right CVA tenderness.  No left CVA tenderness. There is no rebound and no guarding.   Musculoskeletal:      Cervical back: Neck supple.     Neurological: He is alert and oriented to person, place, and time. GCS score is 15. GCS eye subscore is 4. GCS verbal subscore is 5. GCS motor subscore is 6.   Skin: Skin is warm and dry.   Psychiatric: He has a normal mood and affect. Thought content normal.         ED Course   Procedures  Labs Reviewed   CBC W/ AUTO DIFFERENTIAL - Abnormal; Notable for the following components:       Result Value    Gran # (ANC) 9.6 (*)     Mono # 1.1 (*)     Gran % 76.6 (*)     Lymph % 13.4 (*)     All other components within normal limits   COMPREHENSIVE METABOLIC PANEL - Abnormal; Notable for the following components:    Glucose 198 (*)     Creatinine 1.8 (*)     ALT 53 (*)     eGFR 46 (*)     All other components within normal limits   URINALYSIS, REFLEX TO URINE CULTURE - Abnormal; Notable for the following components:    Glucose, UA 2+ (*)     Occult Blood UA 2+ (*)     All other components within normal limits    Narrative:     Specimen Source->Urine   ISTAT PROCEDURE - Abnormal; Notable for the following components:    POC Glucose 193 (*)     POC Creatinine 1.7 (*)     All other components within normal limits   LIPASE   LACTIC ACID, PLASMA   URINALYSIS MICROSCOPIC    Narrative:     Specimen Source->Urine          Imaging Results              CT Renal Stone Study ABD Pelvis WO (Final result)  Result time 02/06/24 06:11:59      Final result by Rodrick Serrano MD (02/06/24 06:11:59)                    Impression:      1. On the right, there is mild hydronephrosis and hydroureter with inflammatory changes in the adjoining retroperitoneal fat planes related to obstructing calculus measuring on the order of 6-7 mm within the distal ureter.  2. Small nonobstructing left renal calculi.  3. Additional details of chronic and incidental findings, as provided in the body of the report.      Electronically signed by: Rodrick Serrano  Date:    02/06/2024  Time:    06:11               Narrative:    EXAMINATION:  CT RENAL STONE STUDY ABD PELVIS WO    CLINICAL HISTORY:  Flank pain, kidney stone suspected;    TECHNIQUE:  Low dose axial images, sagittal and coronal reformations were obtained from the lung bases to the pubic symphysis.  Contrast was not administered.    COMPARISON:  CT of the abdomen and pelvis performed 12/27/2023.    FINDINGS:  Evaluation of the solid organs is limited due to lack of intravenous contrast.    Lower chest: Unremarkable.    URINARY COLLECTING SYSTEM: On the right, there is mild hydronephrosis and hydroureter with inflammatory changes in the adjoining retroperitoneal fat planes related to obstructing calculus measuring on the order of 6-7 mm within the distal ureter.    On the left, at least 2 small nonobstructing intrarenal calculi measuring on the order of 1.5-2 mm are noted within upper mid pole calices.    Liver: Normal contour.  Findings in keeping with hepatic steatosis.  Suspected focal fatty sparing marginating the gallbladder fossa.    Gallbladder and bile ducts: Unremarkable.    Pancreas: Normal contour.    Spleen: Normal contour.    Adrenals: Normal contour.    Lymph nodes: No abdominal or pelvic lymphadenopathy.    Bowel and mesentery: No definite evidence of bowel obstruction.  Mild colonic diverticulosis.  Normal appendix.    Abdominal aorta: Nonaneurysmal.  Mild atherosclerotic calcification.    Inferior vena cava: Unremarkable.    Free fluid or free air: None.    Pelvis:  Unremarkable.    Body wall: Unremarkable.    Bones: Unremarkable.                                       Medications   morphine injection 4 mg (4 mg Intravenous Given 2/6/24 0432)   ondansetron injection 4 mg (4 mg Intravenous Given 2/6/24 0432)   sodium chloride 0.9% bolus 1,000 mL 1,000 mL (0 mLs Intravenous Stopped 2/6/24 0530)   aluminum-magnesium hydroxide-simethicone 200-200-20 mg/5 mL suspension 30 mL (30 mLs Oral Given 2/6/24 8143)     And   LIDOcaine viscous HCl 2% oral solution 15 mL (15 mLs Oral Given 2/6/24 0423)     Medical Decision Making  Amount and/or Complexity of Data Reviewed  Labs: ordered.  Radiology: ordered. Decision-making details documented in ED Course.    Risk  OTC drugs.  Prescription drug management.            Scribe Attestation:   Scribe #1: I performed the above scribed service and the documentation accurately describes the services I performed. I attest to the accuracy of the note.        ED Course as of 02/06/24 2110 Tue Feb 06, 2024   0616 CT Renal Stone Study ABD Pelvis WO  Impression:     1. On the right, there is mild hydronephrosis and hydroureter with inflammatory changes in the adjoining retroperitoneal fat planes related to obstructing calculus measuring on the order of 6-7 mm within the distal ureter.  2. Small nonobstructing left renal calculi.  3. Additional details of chronic and incidental findings, as provided in the body of the report.      [CC]   2349 48-year-old presented to the emergency department for evaluation of right-sided flank pain and left-sided upper abdominal pain.  He has a history of GERD.  He is describing GERD symptoms as well as colicky abdominal pain.  History of kidney stones.  CT shows evidence of a 6-7 mm kidney stone in the distal ureter on the right side.  I will have patient follow up with Urology.  UA is not indicative of UTI.  I do not believe antibiotics are indicated.  Vitals are stable.  Given concern for possible peptic ulcer disease we  will not start patient on NSAIDs and will have him follow up with GI for further evaluation.  Patient's hemoglobin hematocrit 16/47.  Doubt any significant intra-abdominal bleeding.  White count is normal, patient afebrile, doubt sepsis.  Creatinine elevated, unsure of patient's baseline.  He was fluid resuscitated.  Lactic is normal.  Lipase normal, doubt pancreatitis.  No evidence of pancreatitis on CT of the abdomen and pelvis.  Electrolytes within normal limits, doubt derangement.  Patient looks well on re-evaluation and plan for discharge.  Strict return precautions given. I discussed with the patient/family the diagnosis, treatment plan, indications for return to the emergency department, and for expected follow-up. The patient/family verbalized an understanding. The patient/family  asked if there are any questions or concerns. We discuss the case, until all issues are addressed to the patient/family's satisfaction. Patient/family understands and is agreeable to the plan. Patient is stable and ready for discharge.   [CC]      ED Course User Index  [CC] Nelson Walters MD                         I, Nelson Walters MD, personally performed the services described in this documentation. All medical record entries made by the scribe were at my direction and in my presence. I have reviewed the chart and agree that the record reflects my personal performance and is accurate and complete.    Clinical Impression:  Final diagnoses:  [K21.9] Gastroesophageal reflux disease, unspecified whether esophagitis present (Primary)  [N20.1] Ureterolithiasis          ED Disposition Condition    Discharge Stable          ED Prescriptions       Medication Sig Dispense Start Date End Date Auth. Provider    pantoprazole (PROTONIX) 20 MG tablet Take 1 tablet (20 mg total) by mouth once daily. 30 tablet 2/6/2024 3/7/2024 Nelson Walters MD    hyoscyamine (LEVSIN/SL) 0.125 mg Subl Place 1 tablet (0.125 mg total) under the  tongue every 4 (four) hours as needed (abdominal cramping). 28 tablet 2/6/2024 2/13/2024 Nelson Walters MD    ondansetron (ZOFRAN-ODT) 4 MG TbDL Take 1 tablet (4 mg total) by mouth every 8 (eight) hours as needed (nausea). 20 tablet 2/6/2024 -- Nelson Walters MD    HYDROcodone-acetaminophen (NORCO) 7.5-325 mg per tablet Take 1 tablet by mouth every 6 (six) hours as needed for Pain. 12 tablet 2/6/2024 -- Nelson Walters MD          Follow-up Information       Follow up With Specialties Details Why Contact Info    Alexis Roger MD Internal Medicine, Wound Care Schedule an appointment as soon as possible for a visit in 3 days  [address] [facility] [address]  [phone]      [facility] Emergency Medicine Go to  If symptoms worsen [address] [facility] [place] [id number] [phone]             Nelson Walters MD  02/06/24 5945

## 2024-02-06 NOTE — DISCHARGE INSTRUCTIONS
Take medication as prescribed.  Eovist 6-7 mm kidney stone on the right side.  I have referred you to urology.  Please make an appointment.  I have also referred her to Gastroenterology for further evaluation.  Thank you for coming to our Emergency Department today. It is important to remember that some problems or medical conditions are difficult to diagnose and may not be found or addressed during your Emergency Department visit.  These conditions often start with non-specific symptoms and can only be diagnosed on follow up visits with your primary care physician or specialist when the symptoms continue or change. Please remember that all medical conditions can change, and we cannot predict how you will be feeling tomorrow or the next day.    Return to the ER with any questions/concerns, new/concerning symptoms, worsening, failure to improve or inability to obtain follow-up.       Be sure to follow up with your primary care doctor and review all labs/imaging/tests that were performed during your ER visit with them. It is very common for us to identify non-emergent incidental findings which must be followed up with your primary care physician.  Some labs/imaging/tests may be outside of the normal range, and require non-emergent follow-up and/or further investigation/treatment/procedures/testing to help diagnose/exclude/prevent complications or other potentially serious medical conditions. Some abnormalities may not have been discussed or addressed during your ER visit. Some lab results may not return during your ER visit but can be accessible by downloading the free [facility] zari or by visiting [email or web address] . It is important for you to review all labs/imaging/tests which are outside of the normal range with your physician.    An ER visit does not replace a primary care visit, and many screening tests or follow-up tests cannot be ordered by an ER doctor or performed by the ER. Some tests may even  require pre-approval.    If you do not have a primary care doctor, you may contact the one listed on your discharge paperwork or you may also call the [facility] Appointment Desk at [phone] , or [address] [place] [address] at  [phone] to schedule an appointment, or establish care with a primary care doctor or even a specialist and to obtain information about local resources. It is important to your health that you have a primary care doctor.    Please take all medications as directed. We have done our best to select a medication for you that will treat your condition however, all medications may potentially have side-effects and it is impossible to predict which medications may give you side-effects or what those side-effects (if any) those medications may give you.  If you feel that you are having a negative effect or side-effect of any medication you should stop taking those medications immediately and seek medical attention. If you feel that you are having a life-threatening reaction call 911.        Do not drive, swim, climb to height, take a bath, operate heavy machinery, drink alcohol or take potentially sedating medications, sign any legal documents or make any important decisions for 24 hours if you have received any pain medications, sedatives or mood altering drugs during your ER visit or within 24 hours of taking them if they have been prescribed to you.     You can find additional resources for Dentists, hearing aids, durable medical equipment, low cost pharmacies and other resources at [email or web address]

## 2024-02-06 NOTE — ED NOTES
Pt c/o diffuse ABD pain that wraps to back. Pt denies N/V/D. Pt last BM was at 20:00 hrs last night and states it was normal for him. Pt denies other complaints.    Review of patient's allergies indicates:  No Known Allergies     Patient has verified the spelling of their name and  on armband.   APPEARANCE: Patient is alert, calm, oriented x 4, and does not appear distressed.  SKIN: Skin is normal for race, warm, and dry. Normal skin turgor and mucous membranes moist.  CARDIAC: Normal rate and rhythm, no murmur heard.   RESPIRATORY:Normal rate and effort. Breath sounds clear bilaterally throughout chest. Respirations are equal and unlabored.    GASTRO: Bowel sounds normal, abdomen is soft, diffuse tenderness, and no abdominal distention.  MUSCLE: Full ROM. No bony tenderness or soft tissue tenderness. No obvious deformity.  PERIPHERAL VASCULAR: peripheral pulses present. Normal cap refill. No edema. Warm to touch.  NEURO: 5/5 strength major flexors/extensors bilaterally. Sensory intact to light touch bilaterally. Gustavo coma scale: eyes open spontaneously-4, oriented & converses-5, obeys commands-6. No neurological abnormalities.   MENTAL STATUS: awake, alert and aware of environment.  : Voids without complication      ED orders in progress. Pt SR up x 2. Bed in lowest position with wheels locked. Call bell within reach of pt.

## 2024-02-06 NOTE — TELEPHONE ENCOUNTER
Called pt no answer. Left vm for pt to return call pertaining to today's visit. Visit has been scheduled incorrectly and pt will need to be seen in office in 2-3 week with.

## 2024-02-24 ENCOUNTER — HOSPITAL ENCOUNTER (EMERGENCY)
Facility: HOSPITAL | Age: 49
Discharge: HOME | End: 2024-02-24
Attending: STUDENT IN AN ORGANIZED HEALTH CARE EDUCATION/TRAINING PROGRAM
Payer: COMMERCIAL

## 2024-02-24 VITALS
HEART RATE: 70 BPM | WEIGHT: 165 LBS | DIASTOLIC BLOOD PRESSURE: 90 MMHG | RESPIRATION RATE: 20 BRPM | OXYGEN SATURATION: 97 % | TEMPERATURE: 99 F | SYSTOLIC BLOOD PRESSURE: 164 MMHG | HEIGHT: 65 IN | BODY MASS INDEX: 27.49 KG/M2

## 2024-02-24 DIAGNOSIS — N20.1 URETEROLITHIASIS: Primary | ICD-10-CM

## 2024-02-24 DIAGNOSIS — N13.30 HYDRONEPHROSIS, UNSPECIFIED HYDRONEPHROSIS TYPE: ICD-10-CM

## 2024-02-24 LAB
ALBUMIN SERPL BCP-MCNC: 4.1 G/DL (ref 3.5–5.2)
ALP SERPL-CCNC: 94 U/L (ref 55–135)
ALT SERPL W/O P-5'-P-CCNC: 56 U/L (ref 10–44)
ANION GAP SERPL CALC-SCNC: 12 MMOL/L (ref 8–16)
AST SERPL-CCNC: 26 U/L (ref 10–40)
BACTERIA #/AREA URNS HPF: NORMAL /HPF
BASOPHILS # BLD AUTO: 0.02 K/UL (ref 0–0.2)
BASOPHILS NFR BLD: 0.2 % (ref 0–1.9)
BILIRUB SERPL-MCNC: 0.7 MG/DL (ref 0.1–1)
BILIRUB UR QL STRIP: NEGATIVE
BUN SERPL-MCNC: 23 MG/DL (ref 6–20)
CALCIUM SERPL-MCNC: 9 MG/DL (ref 8.7–10.5)
CHLORIDE SERPL-SCNC: 100 MMOL/L (ref 95–110)
CLARITY UR: CLEAR
CO2 SERPL-SCNC: 25 MMOL/L (ref 23–29)
COLOR UR: COLORLESS
CREAT SERPL-MCNC: 2 MG/DL (ref 0.5–1.4)
DIFFERENTIAL METHOD BLD: ABNORMAL
EOSINOPHIL # BLD AUTO: 0 K/UL (ref 0–0.5)
EOSINOPHIL NFR BLD: 0.2 % (ref 0–8)
ERYTHROCYTE [DISTWIDTH] IN BLOOD BY AUTOMATED COUNT: 11.5 % (ref 11.5–14.5)
EST. GFR  (NO RACE VARIABLE): 40 ML/MIN/1.73 M^2
GLUCOSE SERPL-MCNC: 238 MG/DL (ref 70–110)
GLUCOSE UR QL STRIP: ABNORMAL
HCT VFR BLD AUTO: 46.1 % (ref 40–54)
HGB BLD-MCNC: 15.7 G/DL (ref 14–18)
HGB UR QL STRIP: NEGATIVE
IMM GRANULOCYTES # BLD AUTO: 0.05 K/UL (ref 0–0.04)
IMM GRANULOCYTES NFR BLD AUTO: 0.4 % (ref 0–0.5)
KETONES UR QL STRIP: ABNORMAL
LEUKOCYTE ESTERASE UR QL STRIP: NEGATIVE
LIPASE SERPL-CCNC: 34 U/L (ref 4–60)
LYMPHOCYTES # BLD AUTO: 1.4 K/UL (ref 1–4.8)
LYMPHOCYTES NFR BLD: 10.9 % (ref 18–48)
MAGNESIUM SERPL-MCNC: 2.2 MG/DL (ref 1.6–2.6)
MCH RBC QN AUTO: 30.5 PG (ref 27–31)
MCHC RBC AUTO-ENTMCNC: 34.1 G/DL (ref 32–36)
MCV RBC AUTO: 90 FL (ref 82–98)
MICROSCOPIC COMMENT: NORMAL
MONOCYTES # BLD AUTO: 1.1 K/UL (ref 0.3–1)
MONOCYTES NFR BLD: 8.4 % (ref 4–15)
NEUTROPHILS # BLD AUTO: 10.6 K/UL (ref 1.8–7.7)
NEUTROPHILS NFR BLD: 79.9 % (ref 38–73)
NITRITE UR QL STRIP: NEGATIVE
NRBC BLD-RTO: 0 /100 WBC
PH UR STRIP: 7 [PH] (ref 5–8)
PLATELET # BLD AUTO: 221 K/UL (ref 150–450)
PMV BLD AUTO: 10.5 FL (ref 9.2–12.9)
POTASSIUM SERPL-SCNC: 4.3 MMOL/L (ref 3.5–5.1)
PROT SERPL-MCNC: 7.6 G/DL (ref 6–8.4)
PROT UR QL STRIP: NEGATIVE
RBC # BLD AUTO: 5.14 M/UL (ref 4.6–6.2)
SODIUM SERPL-SCNC: 137 MMOL/L (ref 136–145)
SP GR UR STRIP: 1.02 (ref 1–1.03)
URN SPEC COLLECT METH UR: ABNORMAL
UROBILINOGEN UR STRIP-ACNC: NEGATIVE EU/DL
WBC # BLD AUTO: 13.27 K/UL (ref 3.9–12.7)
YEAST URNS QL MICRO: NORMAL

## 2024-02-24 PROCEDURE — 85025 COMPLETE CBC W/AUTO DIFF WBC: CPT | Performed by: STUDENT IN AN ORGANIZED HEALTH CARE EDUCATION/TRAINING PROGRAM

## 2024-02-24 PROCEDURE — 81000 URINALYSIS NONAUTO W/SCOPE: CPT | Performed by: STUDENT IN AN ORGANIZED HEALTH CARE EDUCATION/TRAINING PROGRAM

## 2024-02-24 PROCEDURE — 96361 HYDRATE IV INFUSION ADD-ON: CPT

## 2024-02-24 PROCEDURE — 83735 ASSAY OF MAGNESIUM: CPT | Performed by: STUDENT IN AN ORGANIZED HEALTH CARE EDUCATION/TRAINING PROGRAM

## 2024-02-24 PROCEDURE — 83690 ASSAY OF LIPASE: CPT | Performed by: STUDENT IN AN ORGANIZED HEALTH CARE EDUCATION/TRAINING PROGRAM

## 2024-02-24 PROCEDURE — 96376 TX/PRO/DX INJ SAME DRUG ADON: CPT

## 2024-02-24 PROCEDURE — 99285 EMERGENCY DEPT VISIT HI MDM: CPT | Mod: 25

## 2024-02-24 PROCEDURE — 80053 COMPREHEN METABOLIC PANEL: CPT | Performed by: STUDENT IN AN ORGANIZED HEALTH CARE EDUCATION/TRAINING PROGRAM

## 2024-02-24 PROCEDURE — 63600175 PHARM REV CODE 636 W HCPCS: Performed by: STUDENT IN AN ORGANIZED HEALTH CARE EDUCATION/TRAINING PROGRAM

## 2024-02-24 PROCEDURE — 96375 TX/PRO/DX INJ NEW DRUG ADDON: CPT

## 2024-02-24 PROCEDURE — 25000003 PHARM REV CODE 250: Performed by: STUDENT IN AN ORGANIZED HEALTH CARE EDUCATION/TRAINING PROGRAM

## 2024-02-24 PROCEDURE — 96374 THER/PROPH/DIAG INJ IV PUSH: CPT

## 2024-02-24 RX ORDER — HYDROCODONE BITARTRATE AND ACETAMINOPHEN 5; 325 MG/1; MG/1
1 TABLET ORAL EVERY 6 HOURS PRN
Qty: 12 TABLET | Refills: 0 | Status: SHIPPED | OUTPATIENT
Start: 2024-02-24 | End: 2024-02-24

## 2024-02-24 RX ORDER — ONDANSETRON HYDROCHLORIDE 2 MG/ML
4 INJECTION, SOLUTION INTRAVENOUS
Status: COMPLETED | OUTPATIENT
Start: 2024-02-24 | End: 2024-02-24

## 2024-02-24 RX ORDER — HYDROCODONE BITARTRATE AND ACETAMINOPHEN 10; 325 MG/1; MG/1
1 TABLET ORAL
Status: COMPLETED | OUTPATIENT
Start: 2024-02-24 | End: 2024-02-24

## 2024-02-24 RX ORDER — HYDROCODONE BITARTRATE AND ACETAMINOPHEN 5; 325 MG/1; MG/1
2 TABLET ORAL EVERY 6 HOURS PRN
Qty: 12 TABLET | Refills: 0 | Status: SHIPPED | OUTPATIENT
Start: 2024-02-24

## 2024-02-24 RX ORDER — MORPHINE SULFATE 4 MG/ML
4 INJECTION, SOLUTION INTRAMUSCULAR; INTRAVENOUS
Status: COMPLETED | OUTPATIENT
Start: 2024-02-24 | End: 2024-02-24

## 2024-02-24 RX ADMIN — HYDROCODONE BITARTRATE AND ACETAMINOPHEN 1 TABLET: 10; 325 TABLET ORAL at 06:02

## 2024-02-24 RX ADMIN — MORPHINE SULFATE 4 MG: 4 INJECTION, SOLUTION INTRAMUSCULAR; INTRAVENOUS at 04:02

## 2024-02-24 RX ADMIN — ONDANSETRON 4 MG: 2 INJECTION INTRAMUSCULAR; INTRAVENOUS at 04:02

## 2024-02-24 RX ADMIN — MORPHINE SULFATE 4 MG: 4 INJECTION, SOLUTION INTRAMUSCULAR; INTRAVENOUS at 07:02

## 2024-02-24 RX ADMIN — SODIUM CHLORIDE 1000 ML: 9 INJECTION, SOLUTION INTRAVENOUS at 05:02

## 2024-02-24 NOTE — ED TRIAGE NOTES
Tito Barrios, a 48 y.o. male presents to the ED w/ complaint of generalized abdominal and right back/flank pain since 2000 last night that has become constant and more intense. Pt reports taking hydrocodone at 2200 with little relief. Pt states the back pain feels similar to previous kidney stones. Pt also reports the abdominal pain feels similar to his last ED visit and was relieved with a GI cocktail.    Triage note:  Chief Complaint   Patient presents with    Back Pain     Pt c/o right lower back pain, epigastric pain, and urinary frequency with small amounts of urine starting 4hrs ago; pt reports symptoms feel similar to when he had a kidney stone; denies dysuria or hematuria; pt took Hydrocodone & Methylprednisolone at 8pm with no relief    Urinary Frequency     Review of patient's allergies indicates:   Allergen Reactions    Nsaids (non-steroidal anti-inflammatory drug)      Doctor said not to take due to stomach issues     Past Medical History:   Diagnosis Date    Hypertension     Kidney stone on left side

## 2024-02-24 NOTE — ED NOTES
Pharmacy faxed in a request regarding Sascha Solo prescription:    Medication: Levothyroxine  Dose: 88 mcg tab  Quantity: 90  Refills requested:     Following information being requested:  Take 1 tab by mouth once daily  Urgency: Routine    Please call pharmacy back at [facility]    Pharmacy was advised that the Clinic will call them back within 24-72 hours, unless this is a STAT request.        Pt called nurses station requesting pain medication. ED process explained to pt. Understanding verbalized. Will notify provider.

## 2024-02-24 NOTE — ED NOTES
Nurse called to bedside for increased pain and request for more medication. Pain rated 8/10. MD notified.

## 2024-02-24 NOTE — ED PROVIDER NOTES
Encounter Date: 2/24/2024    SCRIBE #1 NOTE: IJose Daniel, am scribing for, and in the presence of,  Nelson Walters MD.       History     Chief Complaint   Patient presents with    Back Pain     Pt c/o right lower back pain, epigastric pain, and urinary frequency with small amounts of urine starting 4hrs ago; pt reports symptoms feel similar to when he had a kidney stone; denies dysuria or hematuria; pt took Hydrocodone & Methylprednisolone at 8pm with no relief    Urinary Frequency     Tito Barrios is a 48 y.o. male with a PMHx of HTN, nephrolithiasis, who presents to the ED for evaluation of epigastric abdominal pain and right flank pain beginning at 8 PM last night. Patient also reports complaints of nausea, and urinary frequency. He endorses attempting treatment with hydrocodone with no immediate relief noted. He notes symptoms are similar to previous kidney stone. No other medications taken PTA. No alleviating or exacerbating factors noted. Denies vomiting, diarrhea, dysuria, hematuria, or other associated symptoms.    The history is provided by the patient. No  was used.     Review of patient's allergies indicates:   Allergen Reactions    Nsaids (non-steroidal anti-inflammatory drug)      Doctor said not to take due to stomach issues     Past Medical History:   Diagnosis Date    Hypertension     Kidney stone on left side      Past Surgical History:   Procedure Laterality Date    URETEROSCOPIC REMOVAL OF URETERIC CALCULUS Left 9/21/2018    Procedure: Cystoscopy, possible retrograde pyelogram, ureteroscopy with laser lithotripsy, stone basketing, placement of ureteral stent;  Surgeon: Mckenzie Lu MD;  Location: [facility];  Service: Urology;  Laterality: Left;     Family History   Problem Relation Age of Onset    No Known Problems Mother     No Known Problems Father     No Known Problems Sister     No Known Problems Brother     No Known Problems Maternal Aunt     No Known  Problems Maternal Uncle     No Known Problems Paternal Aunt     No Known Problems Paternal Uncle     No Known Problems Maternal Grandmother     No Known Problems Maternal Grandfather     No Known Problems Paternal Grandmother     No Known Problems Paternal Grandfather     Amblyopia Neg Hx     Blindness Neg Hx     Cancer Neg Hx     Cataracts Neg Hx     Diabetes Neg Hx     Glaucoma Neg Hx     Hypertension Neg Hx     Macular degeneration Neg Hx     Retinal detachment Neg Hx     Strabismus Neg Hx     Stroke Neg Hx     Thyroid disease Neg Hx      Social History     Tobacco Use    Smoking status: Never    Smokeless tobacco: Never   Substance Use Topics    Alcohol use: No    Drug use: No     Review of Systems   Constitutional:  Negative for chills and fever.   HENT:  Negative for facial swelling and sore throat.    Eyes:  Negative for visual disturbance.   Respiratory:  Negative for cough and shortness of breath.    Cardiovascular:  Negative for chest pain and palpitations.   Gastrointestinal:  Positive for abdominal pain and nausea. Negative for vomiting.   Genitourinary:  Positive for flank pain and frequency. Negative for dysuria and hematuria.   Musculoskeletal:  Negative for back pain.   Skin:  Negative for rash.   Neurological:  Negative for weakness and headaches.   Hematological:  Does not bruise/bleed easily.   Psychiatric/Behavioral: Negative.         Physical Exam     Initial Vitals [02/24/24 0044]   BP Pulse Resp Temp SpO2   (!) 179/107 83 19 98.6 °F (37 °C) 97 %      MAP       --         Physical Exam    Nursing note and vitals reviewed.  Constitutional: He appears well-developed and well-nourished. He is not diaphoretic. No distress.   HENT:   Head: Normocephalic and atraumatic.   Right Ear: External ear normal.   Left Ear: External ear normal.   Nose: Nose normal.   Eyes: Conjunctivae are normal. No scleral icterus.   Neck: Neck supple. No tracheal deviation present.   Cardiovascular:  Normal rate, regular  rhythm and normal heart sounds.     Exam reveals no gallop and no friction rub.       No murmur heard.  Pulmonary/Chest: Breath sounds normal. No respiratory distress.   Abdominal: Abdomen is soft. Bowel sounds are normal. There is abdominal tenderness in the right lower quadrant.   Right flank pain on palpation.    Musculoskeletal:      Cervical back: Neck supple.     Neurological: He is alert and oriented to person, place, and time. GCS score is 15. GCS eye subscore is 4. GCS verbal subscore is 5. GCS motor subscore is 6.   Skin: Skin is warm and dry.   Psychiatric: He has a normal mood and affect. Thought content normal.         ED Course   Procedures  Labs Reviewed   URINALYSIS, REFLEX TO URINE CULTURE - Abnormal; Notable for the following components:       Result Value    Color, UA Colorless (*)     Glucose, UA 4+ (*)     Ketones, UA Trace (*)     All other components within normal limits    Narrative:     Specimen Source->Urine   CBC W/ AUTO DIFFERENTIAL - Abnormal; Notable for the following components:    WBC 13.27 (*)     Gran # (ANC) 10.6 (*)     Immature Grans (Abs) 0.05 (*)     Mono # 1.1 (*)     Gran % 79.9 (*)     Lymph % 10.9 (*)     All other components within normal limits   COMPREHENSIVE METABOLIC PANEL - Abnormal; Notable for the following components:    Glucose 238 (*)     BUN 23 (*)     Creatinine 2.0 (*)     ALT 56 (*)     eGFR 40 (*)     All other components within normal limits   URINALYSIS MICROSCOPIC    Narrative:     Specimen Source->Urine   LIPASE   MAGNESIUM          Imaging Results               CT Renal Stone Study ABD Pelvis WO (Final result)  Result time 02/24/24 04:50:01      Final result by Sj Schulte MD (02/24/24 04:50:01)                   Impression:      Punctate nonobstructing calculi left kidney, similar compared to prior.  No right renal calculi.    Right hydronephrosis and hydroureter down to the right UVJ. Previously demonstrated distal right ureteral calculus is  now identified in the right side of the posterior bladder, adjacent to the UVJ, measuring 7 mm in size and consistent with recently passed right ureteral calculus.    This report was flagged in Epic as abnormal.      Electronically signed by: Sj Schulte MD  Date:    02/24/2024  Time:    04:50               Narrative:    EXAMINATION:  CT RENAL STONE STUDY ABD PELVIS WO    CLINICAL HISTORY:  Flank pain, kidney stone suspected;    TECHNIQUE:  Low dose axial images, sagittal and coronal reformations were obtained from the lung bases to the pubic symphysis.  Contrast was not administered.    COMPARISON:  02/06/2024.    FINDINGS:  Heart: Normal in size. No pericardial effusion.    Lung Bases: Well aerated, without consolidation or pleural fluid.    Liver: Normal in size.  Diffuse decreased attenuation suggesting steatosis.    Gallbladder: No calcified gallstones.    Bile Ducts: No evidence of dilated ducts.    Pancreas: No mass or peripancreatic fat stranding.    Spleen: Unremarkable.    Adrenals: Unremarkable.    Kidneys/ Ureters: Punctate nonobstructing calculi left kidney, similar compared to prior.  No right renal calculi.  Right hydronephrosis and hydroureter down to the right UVJ.  Previously demonstrated distal right ureteral calculus is now identified in the right side of the posterior bladder measuring 7 mm in size.  No ureteral calculus or hydroureteronephrosis on the left.    Bladder: No evidence of wall thickening.    Reproductive organs: Unremarkable.    GI Tract/Mesentery: No evidence of bowel obstruction or inflammation.    Peritoneal Space: No ascites. No free air.    Retroperitoneum: No significant adenopathy.    Abdominal wall: Unremarkable.    Vasculature: No significant atherosclerosis or aneurysm.    Bones: No acute fracture.                                       Medications   morphine injection 4 mg (4 mg Intravenous Given 2/24/24 0406)   ondansetron injection 4 mg (4 mg Intravenous Given  2/24/24 0406)   sodium chloride 0.9% bolus 1,000 mL 1,000 mL (0 mLs Intravenous Stopped 2/24/24 0729)   HYDROcodone-acetaminophen  mg per tablet 1 tablet (1 tablet Oral Given 2/24/24 0602)   morphine injection 4 mg (4 mg Intravenous Given 2/24/24 0733)     Medical Decision Making  Amount and/or Complexity of Data Reviewed  Labs: ordered.  Radiology: ordered. Decision-making details documented in ED Course.    Risk  Prescription drug management.            Scribe Attestation:   Scribe #1: I performed the above scribed service and the documentation accurately describes the services I performed. I attest to the accuracy of the note.        ED Course as of 02/25/24 0417   Sat Feb 24, 2024   0457 CT Renal Stone Study ABD Pelvis WO(!)  Impression:     Punctate nonobstructing calculi left kidney, similar compared to prior.  No right renal calculi.     Right hydronephrosis and hydroureter down to the right UVJ. Previously demonstrated distal right ureteral calculus is now identified in the right side of the posterior bladder, adjacent to the UVJ, measuring 7 mm in size and consistent with recently passed right ureteral calculus.     This report was flagged in Epic as abnormal.      [CC]   Sun Feb 25, 2024 0416 48-year-old male presenting to the emergency department for evaluation of flank pain.  Patient has known right-sided ureterolithiasis.  CT shows evidence that stone is passed into the patient's bladder.  Counseled on the need to continue follow up with Urology.  Pain treated in the emergency department with morphine as patient is allergic to NSAIDs and patient's creatinine is elevated.  He was fluid resuscitated.  Cranial similar to 2 weeks prior inpatient has follow up with urology in the interim.  Hypertensive in the setting of pain.  Otherwise vitals are stable.  UA is not indicative of UTI.  And I do not believe antibiotics are indicated.  Plan for discharge.  Strict return precautions given. I discussed  with the patient/family the diagnosis, treatment plan, indications for return to the emergency department, and for expected follow-up. The patient/family verbalized an understanding. The patient/family  asked if there are any questions or concerns. We discuss the case, until all issues are addressed to the patient/family's satisfaction. Patient/family understands and is agreeable to the plan. Patient is stable and ready for discharge.   [CC]      ED Course User Index  [CC] Nelson Walters MD I, Clifford Cranford, MD, personally performed the services described in this documentation. All medical record entries made by the scribe were at my direction and in my presence. I have reviewed the chart and agree that the record reflects my personal performance and is accurate and complete.    Clinical Impression:  Final diagnoses:  [N20.1] Ureterolithiasis (Primary)  [N13.30] Hydronephrosis, unspecified hydronephrosis type          ED Disposition Condition    Discharge Stable          ED Prescriptions       Medication Sig Dispense Start Date End Date Auth. Provider    HYDROcodone-acetaminophen (NORCO) 5-325 mg per tablet  (Status: Discontinued) Take 1 tablet by mouth every 6 (six) hours as needed for Pain. 12 tablet 2/24/2024 2/24/2024 Nelson Walters MD    HYDROcodone-acetaminophen (NORCO) 5-325 mg per tablet Take 2 tablets by mouth every 6 (six) hours as needed for Pain. 12 tablet 2/24/2024 -- Nelson Walters MD          Follow-up Information       Follow up With Specialties Details Why Contact Info    Alexis Roger MD Internal Medicine, Wound Care Schedule an appointment as soon as possible for a visit   [address] [facility] [address]  [phone]      [facility] Emergency [facility] Emergency Medicine Go to  If symptoms worsen [address] [facility] [place] [address] [phone]    Your urologist  Schedule an appointment as  soon as possible for a visit                Nelson Walters MD  02/25/24 0410

## 2024-02-24 NOTE — DISCHARGE INSTRUCTIONS
Please follow back up with your urologist.  You have passed the stone is now in your bladder.  Continue to strain your urine to completely passed the stone.

## 2025-05-12 ENCOUNTER — OFFICE VISIT (OUTPATIENT)
Dept: FAMILY MEDICINE | Facility: CLINIC | Age: 50
End: 2025-05-12
Payer: COMMERCIAL

## 2025-05-12 VITALS
SYSTOLIC BLOOD PRESSURE: 138 MMHG | DIASTOLIC BLOOD PRESSURE: 96 MMHG | HEART RATE: 85 BPM | WEIGHT: 171.75 LBS | HEIGHT: 65 IN | OXYGEN SATURATION: 97 % | BODY MASS INDEX: 28.61 KG/M2

## 2025-05-12 DIAGNOSIS — I10 HYPERTENSION, ESSENTIAL: ICD-10-CM

## 2025-05-12 DIAGNOSIS — N20.0 NEPHROLITHIASIS: ICD-10-CM

## 2025-05-12 DIAGNOSIS — M54.50 CHRONIC BILATERAL LOW BACK PAIN WITHOUT SCIATICA: ICD-10-CM

## 2025-05-12 DIAGNOSIS — G47.00 INSOMNIA, UNSPECIFIED TYPE: ICD-10-CM

## 2025-05-12 DIAGNOSIS — Z00.00 ANNUAL PHYSICAL EXAM: Primary | ICD-10-CM

## 2025-05-12 DIAGNOSIS — G89.29 CHRONIC BILATERAL LOW BACK PAIN WITHOUT SCIATICA: ICD-10-CM

## 2025-05-12 PROCEDURE — 99999 PR PBB SHADOW E&M-EST. PATIENT-LVL IV: CPT | Mod: PBBFAC,,, | Performed by: FAMILY MEDICINE

## 2025-05-12 PROCEDURE — 99203 OFFICE O/P NEW LOW 30 MIN: CPT | Mod: 25,S$GLB,, | Performed by: FAMILY MEDICINE

## 2025-05-12 PROCEDURE — 3075F SYST BP GE 130 - 139MM HG: CPT | Mod: CPTII,S$GLB,, | Performed by: FAMILY MEDICINE

## 2025-05-12 PROCEDURE — 3080F DIAST BP >= 90 MM HG: CPT | Mod: CPTII,S$GLB,, | Performed by: FAMILY MEDICINE

## 2025-05-12 PROCEDURE — 1160F RVW MEDS BY RX/DR IN RCRD: CPT | Mod: CPTII,S$GLB,, | Performed by: FAMILY MEDICINE

## 2025-05-12 PROCEDURE — 3008F BODY MASS INDEX DOCD: CPT | Mod: CPTII,S$GLB,, | Performed by: FAMILY MEDICINE

## 2025-05-12 PROCEDURE — 99386 PREV VISIT NEW AGE 40-64: CPT | Mod: S$GLB,,, | Performed by: FAMILY MEDICINE

## 2025-05-12 PROCEDURE — 1159F MED LIST DOCD IN RCRD: CPT | Mod: CPTII,S$GLB,, | Performed by: FAMILY MEDICINE

## 2025-05-12 RX ORDER — TRAZODONE HYDROCHLORIDE 50 MG/1
50 TABLET ORAL NIGHTLY
Qty: 30 TABLET | Refills: 11 | Status: SHIPPED | OUTPATIENT
Start: 2025-05-12 | End: 2026-05-12

## 2025-05-12 RX ORDER — DICLOFENAC SODIUM 10 MG/G
2 GEL TOPICAL 4 TIMES DAILY
Qty: 200 G | Refills: 1 | Status: SHIPPED | OUTPATIENT
Start: 2025-05-12

## 2025-05-12 RX ORDER — AMLODIPINE BESYLATE 10 MG/1
10 TABLET ORAL DAILY
Qty: 90 TABLET | Refills: 1 | Status: SHIPPED | OUTPATIENT
Start: 2025-05-12 | End: 2026-05-12

## 2025-05-12 NOTE — PROGRESS NOTES
Routine Office Visit     Patient Name: Tito Barrios    : 1975  MRN: [id number]    Subjective     History of Present Illness    CHIEF COMPLAINT:  Annual exam / establish care / new to me   Modified visit - topics discussed outside of annual physical    Insomnia   Chronic back pain   Hypertension     HPI:  Patient reports three main concerns: sleep problems, high blood pressure, and pain. He has difficulty falling asleep, which is his primary sleep issue. 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 It often takes until 7:00 or 8:00 AM for him to fall asleep, and then he only sleeps for 2-3 hours before waking up. He finds it challenging to fall back asleep after waking. This sleep pattern makes it difficult for him to get enough rest for his following work shifts. He has been working nights for 10 years, with the first 2 years involving rotating between day and night shifts.    Patient reports feeling light-headed and sometimes dizzy, which he attributes to his elevated blood pressure. His blood pressure has increased recently. He was previously on amlodipine but is currently not taking any medication for this condition. He expresses a desire to resume blood pressure medication.    He has pain in his lower back, both knees, and the shoulder area, rating it as 3 out of 10 in severity. 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 He has tried OTC pain relief methods such as Tylenol, ibuprofen, and Salonpas patches. These provide temporary relief but are not sufficiently effective for his pain. Ibuprofen causes him abdominal pain.    For his sleep issues, he has tried melatonin, which is sometimes effective. He has not tried any other sleep aids besides melatonin.    Patient denies recent loss of appetite, weight loss, fevers, or night sweats.    MEDICATIONS:  Patient is on Tylenol and Ibuprofen as needed for pain. He takes Melatonin  "as needed for sleep, which is sometimes effective. He uses Salonpas patches as needed for pain, providing temporary relief. Patient discontinued amlodipine for high blood pressure, which he had taken 1-2 years ago. He also discontinued Norco (hydrocodone) for kidney stones.    MEDICAL HISTORY:  Patient has a history of hypertension. He also has a history of kidney stones, with small stones still present. Patient has received a tetanus vaccine in the past.    TEST RESULTS:  Patient undergoes annual HIV and Hepatitis C screenings at his workplace. Both tests have yielded negative results.    ALLERGIES:  Patient is allergic to insects. He experiences abdominal pain with NSAIDs (like ibuprofen).    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, patrolling     History: [employer]       ROS:  General: no fever, no chills, no fatigue, no weight gain, no weight loss  Eyes: no vision changes, no redness, no discharge  ENT: no ear pain, no nasal congestion, no sore throat  Cardiovascular: no chest pain, no palpitations, no lower extremity edema  Respiratory: no cough, no shortness of breath  Gastrointestinal: +abdominal pain, no nausea, no vomiting, no diarrhea, no constipation, no blood in stool  Genitourinary: no dysuria, no hematuria, no frequency  Musculoskeletal: +joint pain, no muscle pain, +back pain, +pain with movement  Skin: no rash, no lesion  Neurological: no headache, +dizziness, no numbness, no tingling, +difficulty falling asleep, +difficulty staying asleep, +lightheadedness  Psychiatric: no anxiety, no depression, +sleep difficulty           Objective     BP (!) 138/96 (BP Location: Right arm, Patient Position: Sitting)   Pulse 85   Ht 5' 5" (1.651 m)   Wt 77.9 kg (171 lb 11.8 oz)   SpO2 97%   BMI 28.58 kg/m²   Physical Exam  Constitutional:       Appearance: He is well-developed.   HENT:      Head: Normocephalic and " atraumatic.   Eyes:      Conjunctiva/sclera: Conjunctivae normal.      Pupils: Pupils are equal, round, and reactive to light.   Neck:      Thyroid: No thyromegaly.      Vascular: No JVD.   Cardiovascular:      Rate and Rhythm: Normal rate and regular rhythm.      Heart sounds: Normal heart sounds.   Pulmonary:      Effort: Pulmonary effort is normal.      Breath sounds: Normal breath sounds. No wheezing.   Abdominal:      General: Bowel sounds are normal. There is no distension.      Palpations: Abdomen is soft.      Tenderness: There is no abdominal tenderness. There is no guarding.   Musculoskeletal:         General: Normal range of motion.      Cervical back: Normal range of motion and neck supple.   Lymphadenopathy:      Cervical: No cervical adenopathy.   Skin:     General: Skin is warm and dry.   Neurological:      Mental Status: He is alert and oriented to person, place, and time.   Psychiatric:         Behavior: Behavior normal.           Assessment     Assessment & Plan              Problem List Items Addressed This Visit          Cardiac/Vascular    Hypertension, essential    Relevant Medications    amLODIPine (NORVASC) 10 MG tablet    diclofenac sodium (VOLTAREN) 1 % Gel   Assessed hypertension and started Amlodipine 1 tablet daily for blood pressure management.   Blood pressure was slightly elevated today, though not severe, with stress and pain as contributing factors.   Patient reports feeling light-headed and sometimes dizzy with high blood pressure.   Instructed patient to monitor blood pressure at home, initially more frequently, then every 1-2 weeks if well-controlled.   Advised to check blood pressure if experiencing headaches or chest pain.   Scheduled a nurse visit for blood pressure check and recommended follow up in 6 months.         Renal/    Nephrolithiasis  Noted in medical history        Other Visit Diagnoses         Annual physical exam    -  Primary    Relevant Orders    CBC Auto  Differential    Comprehensive Metabolic Panel    Lipid Panel    PSA, Screening    Hemoglobin A1C    TSH  I addressed all major concerns as it related to health maintenance.  All were ordered and scheduled based on the patients wishes.  Any additional health maintenance will be readdressed at the next physical if declined or deferred by the patient.       Modified visit - topics discussed outside of annual physical    ROS: insomnia   PE: as above       Insomnia, unspecified type        Relevant Medications    traZODone (DESYREL) 50 MG tablet   Patient works night shifts from 4:00 PM to 4:00 AM for 12 years (first 2 years rotating between days and nights) and has difficulty falling asleep, sometimes taking until 7-8 AM after returning home around 5 AM, sleeping only 2-3 hours before waking up and struggling to fall back asleep.   Evaluated sleep issues and prescribed Trazodone to be taken immediately after returning home from work at 4:30 AM.   Educated on the potential for initial grogginess with sleep medication until the body adjusts.        Chronic bilateral low back pain without sciatica      Relevant Medications   diclofenac sodium (VOLTAREN) 1 % Gel   Patient reports lower back pain, rated 3 out of 10.   Prescribed topical anti-inflammatory cream (Diclofenac) to be mixed with Lidocaine gel and Tiger Balm for chronic back pain symptom management.                   This note was generated with the assistance of ambient listening technology. Verbal consent was obtained by the patient and accompanying visitor(s) for the recording of patient appointment to facilitate this note. I attest to having reviewed and edited the generated note for accuracy, though some syntax or spelling errors may persist. Please contact the author of this note for any clarification.

## 2025-05-20 ENCOUNTER — LAB VISIT (OUTPATIENT)
Dept: LAB | Facility: HOSPITAL | Age: 50
End: 2025-05-20
Attending: FAMILY MEDICINE
Payer: COMMERCIAL

## 2025-05-20 ENCOUNTER — RESULTS FOLLOW-UP (OUTPATIENT)
Dept: FAMILY MEDICINE | Facility: CLINIC | Age: 50
End: 2025-05-20
Payer: COMMERCIAL

## 2025-05-20 ENCOUNTER — TELEPHONE (OUTPATIENT)
Dept: FAMILY MEDICINE | Facility: CLINIC | Age: 50
End: 2025-05-20

## 2025-05-20 DIAGNOSIS — E78.5 HYPERLIPIDEMIA, UNSPECIFIED HYPERLIPIDEMIA TYPE: ICD-10-CM

## 2025-05-20 DIAGNOSIS — Z00.00 ANNUAL PHYSICAL EXAM: ICD-10-CM

## 2025-05-20 DIAGNOSIS — E11.65 TYPE 2 DIABETES MELLITUS WITH HYPERGLYCEMIA, WITHOUT LONG-TERM CURRENT USE OF INSULIN: Primary | ICD-10-CM

## 2025-05-20 LAB
ABSOLUTE EOSINOPHIL (OHS): 0.33 K/UL
ABSOLUTE MONOCYTE (OHS): 0.64 K/UL (ref 0.3–1)
ABSOLUTE NEUTROPHIL COUNT (OHS): 4 K/UL (ref 1.8–7.7)
ALBUMIN SERPL BCP-MCNC: 3.9 G/DL (ref 3.5–5.2)
ALP SERPL-CCNC: 138 UNIT/L (ref 40–150)
ALT SERPL W/O P-5'-P-CCNC: 57 UNIT/L (ref 10–44)
ANION GAP (OHS): 9 MMOL/L (ref 8–16)
AST SERPL-CCNC: 30 UNIT/L (ref 11–45)
BASOPHILS # BLD AUTO: 0.05 K/UL
BASOPHILS NFR BLD AUTO: 0.6 %
BILIRUB SERPL-MCNC: 0.5 MG/DL (ref 0.1–1)
BUN SERPL-MCNC: 16 MG/DL (ref 6–20)
CALCIUM SERPL-MCNC: 8.9 MG/DL (ref 8.7–10.5)
CHLORIDE SERPL-SCNC: 104 MMOL/L (ref 95–110)
CHOLEST SERPL-MCNC: 245 MG/DL (ref 120–199)
CHOLEST/HDLC SERPL: 5.1 {RATIO} (ref 2–5)
CO2 SERPL-SCNC: 25 MMOL/L (ref 23–29)
CREAT SERPL-MCNC: 1.1 MG/DL (ref 0.5–1.4)
EAG (OHS): 255 MG/DL (ref 68–131)
ERYTHROCYTE [DISTWIDTH] IN BLOOD BY AUTOMATED COUNT: 11.5 % (ref 11.5–14.5)
GFR SERPLBLD CREATININE-BSD FMLA CKD-EPI: >60 ML/MIN/1.73/M2
GLUCOSE SERPL-MCNC: 233 MG/DL (ref 70–110)
HBA1C MFR BLD: 10.5 % (ref 4–5.6)
HCT VFR BLD AUTO: 46.7 % (ref 40–54)
HDLC SERPL-MCNC: 48 MG/DL (ref 40–75)
HDLC SERPL: 19.6 % (ref 20–50)
HGB BLD-MCNC: 15.4 GM/DL (ref 14–18)
IMM GRANULOCYTES # BLD AUTO: 0.02 K/UL (ref 0–0.04)
IMM GRANULOCYTES NFR BLD AUTO: 0.2 % (ref 0–0.5)
LDLC SERPL CALC-MCNC: 151 MG/DL (ref 63–159)
LYMPHOCYTES # BLD AUTO: 2.97 K/UL (ref 1–4.8)
MCH RBC QN AUTO: 29.4 PG (ref 27–31)
MCHC RBC AUTO-ENTMCNC: 33 G/DL (ref 32–36)
MCV RBC AUTO: 89 FL (ref 82–98)
NONHDLC SERPL-MCNC: 197 MG/DL
NUCLEATED RBC (/100WBC) (OHS): 0 /100 WBC
PLATELET # BLD AUTO: 210 K/UL (ref 150–450)
PMV BLD AUTO: 11.4 FL (ref 9.2–12.9)
POTASSIUM SERPL-SCNC: 4 MMOL/L (ref 3.5–5.1)
PROT SERPL-MCNC: 7.5 GM/DL (ref 6–8.4)
PSA SERPL-MCNC: 0.54 NG/ML
RBC # BLD AUTO: 5.24 M/UL (ref 4.6–6.2)
RELATIVE EOSINOPHIL (OHS): 4.1 %
RELATIVE LYMPHOCYTE (OHS): 37.1 % (ref 18–48)
RELATIVE MONOCYTE (OHS): 8 % (ref 4–15)
RELATIVE NEUTROPHIL (OHS): 50 % (ref 38–73)
SODIUM SERPL-SCNC: 138 MMOL/L (ref 136–145)
TRIGL SERPL-MCNC: 230 MG/DL (ref 30–150)
TSH SERPL-ACNC: 3.48 UIU/ML (ref 0.4–4)
WBC # BLD AUTO: 8.01 K/UL (ref 3.9–12.7)

## 2025-05-20 PROCEDURE — 85025 COMPLETE CBC W/AUTO DIFF WBC: CPT

## 2025-05-20 PROCEDURE — 36415 COLL VENOUS BLD VENIPUNCTURE: CPT | Mod: PO

## 2025-05-20 PROCEDURE — 83036 HEMOGLOBIN GLYCOSYLATED A1C: CPT

## 2025-05-20 PROCEDURE — 84153 ASSAY OF PSA TOTAL: CPT

## 2025-05-20 PROCEDURE — 99999 PR PBB SHADOW E&M-EST. PATIENT-LVL I: CPT | Mod: PBBFAC,,, | Performed by: FAMILY MEDICINE

## 2025-05-20 PROCEDURE — 80061 LIPID PANEL: CPT

## 2025-05-20 PROCEDURE — 84443 ASSAY THYROID STIM HORMONE: CPT

## 2025-05-20 PROCEDURE — 82040 ASSAY OF SERUM ALBUMIN: CPT

## 2025-05-20 RX ORDER — LANCING DEVICE
1 EACH MISCELLANEOUS 2 TIMES DAILY WITH MEALS
Qty: 1 EACH | Refills: 0 | Status: SHIPPED | OUTPATIENT
Start: 2025-05-20 | End: 2026-05-20

## 2025-05-20 RX ORDER — LANCETS
1 EACH MISCELLANEOUS 2 TIMES DAILY WITH MEALS
Qty: 100 EACH | Refills: 11 | Status: SHIPPED | OUTPATIENT
Start: 2025-05-20

## 2025-05-20 RX ORDER — METFORMIN HYDROCHLORIDE 1000 MG/1
1000 TABLET ORAL 2 TIMES DAILY WITH MEALS
Qty: 180 TABLET | Refills: 1 | Status: SHIPPED | OUTPATIENT
Start: 2025-05-20 | End: 2026-05-20

## 2025-05-20 RX ORDER — INSULIN PUMP SYRINGE, 3 ML
EACH MISCELLANEOUS
Qty: 1 EACH | Refills: 0 | Status: SHIPPED | OUTPATIENT
Start: 2025-05-20 | End: 2026-05-20

## 2025-05-20 RX ORDER — ATORVASTATIN CALCIUM 20 MG/1
20 TABLET, FILM COATED ORAL DAILY
Qty: 90 TABLET | Refills: 1 | Status: SHIPPED | OUTPATIENT
Start: 2025-05-20 | End: 2026-05-20

## 2025-05-20 NOTE — TELEPHONE ENCOUNTER
Left message on answering machine to return call to clinic. Provider is requesting a 3 months follow up with lab to be scheduled.

## 2025-05-21 NOTE — TELEPHONE ENCOUNTER
Second attempt to reach patient to schedule 3 month appointment with labs. Will send message via [employer]

## 2025-06-12 ENCOUNTER — TELEPHONE (OUTPATIENT)
Dept: FAMILY MEDICINE | Facility: CLINIC | Age: 50
End: 2025-06-12
Payer: COMMERCIAL

## 2025-06-12 NOTE — TELEPHONE ENCOUNTER
Copied from CRM #[id number]. Topic: General Inquiry - Return Call  >> Jun 4, 2025  3:52 PM Helene wrote:  .Type:  Patient Returning Call    Who Called: Self     Who Left Message for Patient: self     Does the patient know what this is regarding?:no     Would the patient rather a call back or a response via [facility]? Call     Best Call Back Number: .[phone]      Additional Information: please leave message if he doesn't answer

## 2025-06-12 NOTE — TELEPHONE ENCOUNTER
LVM with patient to return call back to clinic. Provider is requesting a 3 mnth follow up with labs prior,

## 2025-08-01 DIAGNOSIS — I10 HYPERTENSION, ESSENTIAL: ICD-10-CM

## 2025-08-01 NOTE — TELEPHONE ENCOUNTER
No care due was identified.  [facility] Embedded Care Due Messages. Reference number: [id number].   8/01/2025 10:08:35 AM CDT

## 2025-08-04 RX ORDER — AMLODIPINE BESYLATE 10 MG/1
10 TABLET ORAL
Qty: 90 TABLET | Refills: 1 | Status: SHIPPED | OUTPATIENT
Start: 2025-08-04

## (undated) DEVICE — ADAPT UROLOGICAL 2-WAY STOPCK

## (undated) DEVICE — MAT QUICK 40X30 FLOOR FLUID LF

## (undated) DEVICE — COVER SNAP KAP 26IN

## (undated) DEVICE — BASKET STONE RETRV 1.9FRX120CM

## (undated) DEVICE — SOL IRR STRL WATER 500ML

## (undated) DEVICE — CONTAINER SPECIMEN STRL 4OZ

## (undated) DEVICE — CANISTER SUCTION 2 LTR

## (undated) DEVICE — SYR ONLY LUER LOCK 20CC

## (undated) DEVICE — CATH URTRL OPEN END STR TIP 5F

## (undated) DEVICE — FIBER MOSES 200 DFL

## (undated) DEVICE — SET IRR URLGY 2LINE UNIV SPIKE

## (undated) DEVICE — GLOVE BIOGEL PI MICRO SZ 7

## (undated) DEVICE — SUPPORT ULNA NERVE PROTECTOR

## (undated) DEVICE — GLOVE SURGICAL LATEX SZ 6.5

## (undated) DEVICE — Device

## (undated) DEVICE — CATH URETERAL ANES 10FR 50CM

## (undated) DEVICE — SOL IRR NACL .9% 3000ML

## (undated) DEVICE — GUIDE WIRE MOTION .035 X 150CM

## (undated) DEVICE — SLEEVE SCD EXPRESS CALF MEDIUM

## (undated) DEVICE — SEE MEDLINE ITEM 152532

## (undated) DEVICE — BLANKET UPPER BODY 78.7X29.9IN